# Patient Record
Sex: FEMALE | Race: WHITE | Employment: UNEMPLOYED | URBAN - METROPOLITAN AREA
[De-identification: names, ages, dates, MRNs, and addresses within clinical notes are randomized per-mention and may not be internally consistent; named-entity substitution may affect disease eponyms.]

---

## 2017-08-12 ENCOUNTER — HOSPITAL ENCOUNTER (EMERGENCY)
Age: 37
Discharge: HOME | End: 2017-08-12
Payer: COMMERCIAL

## 2017-08-12 ENCOUNTER — APPOINTMENT (OUTPATIENT)
Dept: GENERAL RADIOLOGY | Age: 37
End: 2017-08-12
Payer: COMMERCIAL

## 2017-08-12 VITALS
OXYGEN SATURATION: 100 % | HEIGHT: 68 IN | TEMPERATURE: 97.9 F | SYSTOLIC BLOOD PRESSURE: 113 MMHG | HEART RATE: 84 BPM | BODY MASS INDEX: 27.28 KG/M2 | WEIGHT: 180 LBS | RESPIRATION RATE: 21 BRPM | DIASTOLIC BLOOD PRESSURE: 64 MMHG

## 2017-08-12 DIAGNOSIS — J20.9 BRONCHITIS, ACUTE, WITH BRONCHOSPASM: Primary | ICD-10-CM

## 2017-08-12 LAB
ABSOLUTE EOS #: 0.08 K/UL (ref 0–0.4)
ABSOLUTE LYMPH #: 2.63 K/UL (ref 1–4.8)
ABSOLUTE MONO #: 0.45 K/UL (ref 0.2–0.8)
ANION GAP SERPL CALCULATED.3IONS-SCNC: 19 MMOL/L (ref 9–17)
BASOPHILS # BLD: 0 %
BASOPHILS ABSOLUTE: 0 K/UL (ref 0–0.2)
BUN BLDV-MCNC: 10 MG/DL (ref 6–20)
BUN/CREAT BLD: 11 (ref 9–20)
CALCIUM SERPL-MCNC: 9.6 MG/DL (ref 8.6–10.4)
CHLORIDE BLD-SCNC: 98 MMOL/L (ref 98–107)
CO2: 21 MMOL/L (ref 20–31)
CREAT SERPL-MCNC: 0.88 MG/DL (ref 0.5–0.9)
D-DIMER QUANTITATIVE: 0.51 MG/L FEU
DIFFERENTIAL TYPE: ABNORMAL
EOSINOPHILS RELATIVE PERCENT: 1 %
GFR AFRICAN AMERICAN: >60 ML/MIN
GFR NON-AFRICAN AMERICAN: >60 ML/MIN
GFR SERPL CREATININE-BSD FRML MDRD: ABNORMAL ML/MIN/{1.73_M2}
GFR SERPL CREATININE-BSD FRML MDRD: ABNORMAL ML/MIN/{1.73_M2}
GLUCOSE BLD-MCNC: 107 MG/DL (ref 70–99)
HCT VFR BLD CALC: 36.7 % (ref 36–46)
HEMOGLOBIN: 11.5 G/DL (ref 12–16)
INR BLD: 1
LYMPHOCYTES # BLD: 35 %
MCH RBC QN AUTO: 19.9 PG (ref 26–34)
MCHC RBC AUTO-ENTMCNC: 31.2 G/DL (ref 31–37)
MCV RBC AUTO: 63.8 FL (ref 80–100)
MONOCYTES # BLD: 6 %
MORPHOLOGY: ABNORMAL
PDW BLD-RTO: 18.7 % (ref 11.5–14.5)
PLATELET # BLD: 776 K/UL (ref 130–400)
PLATELET ESTIMATE: ABNORMAL
PMV BLD AUTO: ABNORMAL FL (ref 6–12)
POTASSIUM SERPL-SCNC: 4.1 MMOL/L (ref 3.7–5.3)
PROTHROMBIN TIME: 10.8 SEC (ref 9.7–11.6)
RBC # BLD: 5.75 M/UL (ref 4–5.2)
RBC # BLD: ABNORMAL 10*6/UL
SEG NEUTROPHILS: 58 %
SEGMENTED NEUTROPHILS ABSOLUTE COUNT: 4.34 K/UL (ref 1.8–7.7)
SODIUM BLD-SCNC: 138 MMOL/L (ref 135–144)
WBC # BLD: 7.5 K/UL (ref 3.5–11)
WBC # BLD: ABNORMAL 10*3/UL

## 2017-08-12 PROCEDURE — 87040 BLOOD CULTURE FOR BACTERIA: CPT

## 2017-08-12 PROCEDURE — 85025 COMPLETE CBC W/AUTO DIFF WBC: CPT

## 2017-08-12 PROCEDURE — 2580000003 HC RX 258

## 2017-08-12 PROCEDURE — 85379 FIBRIN DEGRADATION QUANT: CPT

## 2017-08-12 PROCEDURE — 80048 BASIC METABOLIC PNL TOTAL CA: CPT

## 2017-08-12 PROCEDURE — 85610 PROTHROMBIN TIME: CPT

## 2017-08-12 PROCEDURE — 96374 THER/PROPH/DIAG INJ IV PUSH: CPT

## 2017-08-12 PROCEDURE — 6360000002 HC RX W HCPCS

## 2017-08-12 PROCEDURE — 71010 XR CHEST PORTABLE: CPT

## 2017-08-12 PROCEDURE — 99285 EMERGENCY DEPT VISIT HI MDM: CPT

## 2017-08-12 PROCEDURE — 94640 AIRWAY INHALATION TREATMENT: CPT

## 2017-08-12 PROCEDURE — 93005 ELECTROCARDIOGRAM TRACING: CPT

## 2017-08-12 RX ORDER — AZITHROMYCIN 250 MG/1
TABLET, FILM COATED ORAL
Qty: 1 PACKET | Refills: 0 | Status: SHIPPED | OUTPATIENT
Start: 2017-08-12 | End: 2017-08-22

## 2017-08-12 RX ORDER — ALBUTEROL SULFATE 2.5 MG/3ML
5 SOLUTION RESPIRATORY (INHALATION)
Status: DISCONTINUED | OUTPATIENT
Start: 2017-08-12 | End: 2017-08-13 | Stop reason: HOSPADM

## 2017-08-12 RX ORDER — FLUCONAZOLE 150 MG/1
150 TABLET ORAL ONCE
Qty: 1 TABLET | Refills: 0 | Status: SHIPPED | OUTPATIENT
Start: 2017-08-12 | End: 2017-08-12

## 2017-08-12 RX ORDER — ALBUTEROL SULFATE 90 UG/1
2 AEROSOL, METERED RESPIRATORY (INHALATION)
Status: DISCONTINUED | OUTPATIENT
Start: 2017-08-12 | End: 2017-08-13 | Stop reason: HOSPADM

## 2017-08-12 RX ORDER — IPRATROPIUM BROMIDE AND ALBUTEROL SULFATE 2.5; .5 MG/3ML; MG/3ML
1 SOLUTION RESPIRATORY (INHALATION)
Status: DISCONTINUED | OUTPATIENT
Start: 2017-08-12 | End: 2017-08-13 | Stop reason: HOSPADM

## 2017-08-12 RX ORDER — SODIUM CHLORIDE 9 MG/ML
INJECTION, SOLUTION INTRAVENOUS CONTINUOUS
Status: DISCONTINUED | OUTPATIENT
Start: 2017-08-12 | End: 2017-08-13 | Stop reason: HOSPADM

## 2017-08-12 RX ORDER — PREDNISONE 20 MG/1
20 TABLET ORAL 2 TIMES DAILY
Qty: 10 TABLET | Refills: 0 | Status: SHIPPED | OUTPATIENT
Start: 2017-08-12 | End: 2017-08-22

## 2017-08-12 RX ORDER — METHYLPREDNISOLONE SODIUM SUCCINATE 125 MG/2ML
125 INJECTION, POWDER, LYOPHILIZED, FOR SOLUTION INTRAMUSCULAR; INTRAVENOUS ONCE
Status: COMPLETED | OUTPATIENT
Start: 2017-08-12 | End: 2017-08-12

## 2017-08-12 RX ADMIN — METHYLPREDNISOLONE SODIUM SUCCINATE 125 MG: 125 INJECTION, POWDER, FOR SOLUTION INTRAMUSCULAR; INTRAVENOUS at 21:03

## 2017-08-12 RX ADMIN — SODIUM CHLORIDE: 9 INJECTION, SOLUTION INTRAVENOUS at 21:03

## 2017-08-12 RX ADMIN — ALBUTEROL SULFATE 5 MG: 5 SOLUTION RESPIRATORY (INHALATION) at 21:20

## 2017-08-12 ASSESSMENT — ENCOUNTER SYMPTOMS
SINUS PRESSURE: 1
COUGH: 1
NAUSEA: 1
CHEST TIGHTNESS: 1
VOMITING: 0
ABDOMINAL DISTENTION: 0
SORE THROAT: 1
ABDOMINAL PAIN: 0
PHOTOPHOBIA: 0

## 2017-08-12 ASSESSMENT — PAIN DESCRIPTION - PAIN TYPE: TYPE: ACUTE PAIN

## 2017-08-12 ASSESSMENT — PAIN SCALES - GENERAL: PAINLEVEL_OUTOF10: 5

## 2017-08-12 ASSESSMENT — PAIN DESCRIPTION - LOCATION: LOCATION: RIB CAGE

## 2017-08-15 LAB
EKG ATRIAL RATE: 105 BPM
EKG P AXIS: 33 DEGREES
EKG P-R INTERVAL: 140 MS
EKG Q-T INTERVAL: 350 MS
EKG QRS DURATION: 84 MS
EKG QTC CALCULATION (BAZETT): 462 MS
EKG R AXIS: 31 DEGREES
EKG T AXIS: 6 DEGREES
EKG VENTRICULAR RATE: 105 BPM

## 2017-08-18 LAB
CULTURE: NORMAL
CULTURE: NORMAL
Lab: NORMAL
SPECIMEN DESCRIPTION: NORMAL
SPECIMEN DESCRIPTION: NORMAL
STATUS: NORMAL

## 2020-10-14 ENCOUNTER — HOSPITAL ENCOUNTER (OUTPATIENT)
Age: 40
Discharge: HOME | End: 2020-10-14
Payer: COMMERCIAL

## 2020-10-14 LAB
ABSOLUTE EOS #: 0.38 K/UL (ref 0–0.4)
ABSOLUTE IMMATURE GRANULOCYTE: 0 K/UL (ref 0–0.3)
ABSOLUTE LYMPH #: 2.24 K/UL (ref 1–4.8)
ABSOLUTE MONO #: 0.38 K/UL (ref 0.1–0.8)
ALBUMIN SERPL-MCNC: 3.4 G/DL (ref 3.5–5.2)
ALBUMIN/GLOBULIN RATIO: 1.5 (ref 1–2.5)
ALP BLD-CCNC: 80 U/L (ref 35–104)
ALT SERPL-CCNC: 11 U/L (ref 5–33)
ANION GAP SERPL CALCULATED.3IONS-SCNC: 10 MMOL/L (ref 9–17)
AST SERPL-CCNC: 19 U/L
BASOPHILS # BLD: 1 % (ref 0–2)
BASOPHILS ABSOLUTE: 0.05 K/UL (ref 0–0.2)
BILIRUB SERPL-MCNC: 0.37 MG/DL (ref 0.3–1.2)
BUN BLDV-MCNC: 11 MG/DL (ref 6–20)
BUN/CREAT BLD: ABNORMAL (ref 9–20)
CALCIUM SERPL-MCNC: 8.5 MG/DL (ref 8.6–10.4)
CHLORIDE BLD-SCNC: 109 MMOL/L (ref 98–107)
CHOLESTEROL, FASTING: 106 MG/DL
CHOLESTEROL/HDL RATIO: 2.3
CO2: 22 MMOL/L (ref 20–31)
CREAT SERPL-MCNC: 0.45 MG/DL (ref 0.5–0.9)
DIFFERENTIAL TYPE: ABNORMAL
EOSINOPHILS RELATIVE PERCENT: 8 % (ref 1–4)
ESTIMATED AVERAGE GLUCOSE: 105 MG/DL
FERRITIN: 4 UG/L (ref 13–150)
FOLATE: 13.3 NG/ML
GFR AFRICAN AMERICAN: >60 ML/MIN
GFR NON-AFRICAN AMERICAN: >60 ML/MIN
GFR SERPL CREATININE-BSD FRML MDRD: ABNORMAL ML/MIN/{1.73_M2}
GFR SERPL CREATININE-BSD FRML MDRD: ABNORMAL ML/MIN/{1.73_M2}
GLUCOSE BLD-MCNC: 88 MG/DL (ref 70–99)
HBA1C MFR BLD: 5.3 % (ref 4–6)
HCT VFR BLD CALC: 29.9 % (ref 36.3–47.1)
HDLC SERPL-MCNC: 46 MG/DL
HEMOGLOBIN: 7.5 G/DL (ref 11.9–15.1)
IMMATURE GRANULOCYTES: 0 %
IRON SATURATION: 4 % (ref 20–55)
IRON: 17 UG/DL (ref 37–145)
LDL CHOLESTEROL: 47 MG/DL (ref 0–130)
LYMPHOCYTES # BLD: 48 % (ref 24–44)
MCH RBC QN AUTO: 17.5 PG (ref 25.2–33.5)
MCHC RBC AUTO-ENTMCNC: 25.1 G/DL (ref 28.4–34.8)
MCV RBC AUTO: 69.7 FL (ref 82.6–102.9)
MONOCYTES # BLD: 8 % (ref 1–7)
MORPHOLOGY: ABNORMAL
NRBC AUTOMATED: 0 PER 100 WBC
PDW BLD-RTO: 18.6 % (ref 11.8–14.4)
PLATELET # BLD: 217 K/UL (ref 138–453)
PLATELET ESTIMATE: ABNORMAL
PMV BLD AUTO: 10.7 FL (ref 8.1–13.5)
POTASSIUM SERPL-SCNC: 4.3 MMOL/L (ref 3.7–5.3)
RBC # BLD: 4.29 M/UL (ref 3.95–5.11)
RBC # BLD: ABNORMAL 10*6/UL
SEG NEUTROPHILS: 35 % (ref 36–66)
SEGMENTED NEUTROPHILS ABSOLUTE COUNT: 1.65 K/UL (ref 1.8–7.7)
SODIUM BLD-SCNC: 141 MMOL/L (ref 135–144)
T3 FREE: 2.63 PG/ML (ref 2.02–4.43)
THYROXINE, FREE: 1.03 NG/DL (ref 0.93–1.7)
TOTAL IRON BINDING CAPACITY: 409 UG/DL (ref 250–450)
TOTAL PROTEIN: 5.7 G/DL (ref 6.4–8.3)
TRIGLYCERIDE, FASTING: 67 MG/DL
TSH SERPL DL<=0.05 MIU/L-ACNC: 3.14 MIU/L (ref 0.3–5)
UNSATURATED IRON BINDING CAPACITY: 392 UG/DL (ref 112–347)
VITAMIN B-12: 234 PG/ML (ref 232–1245)
VITAMIN D 25-HYDROXY: 6.2 NG/ML (ref 30–100)
VLDLC SERPL CALC-MCNC: NORMAL MG/DL (ref 1–30)
WBC # BLD: 4.7 K/UL (ref 3.5–11.3)
WBC # BLD: ABNORMAL 10*3/UL

## 2020-10-14 PROCEDURE — 82306 VITAMIN D 25 HYDROXY: CPT

## 2020-10-14 PROCEDURE — 80053 COMPREHEN METABOLIC PANEL: CPT

## 2020-10-14 PROCEDURE — 84481 FREE ASSAY (FT-3): CPT

## 2020-10-14 PROCEDURE — 82607 VITAMIN B-12: CPT

## 2020-10-14 PROCEDURE — 83540 ASSAY OF IRON: CPT

## 2020-10-14 PROCEDURE — 83550 IRON BINDING TEST: CPT

## 2020-10-14 PROCEDURE — 84439 ASSAY OF FREE THYROXINE: CPT

## 2020-10-14 PROCEDURE — 85025 COMPLETE CBC W/AUTO DIFF WBC: CPT

## 2020-10-14 PROCEDURE — 82728 ASSAY OF FERRITIN: CPT

## 2020-10-14 PROCEDURE — 80061 LIPID PANEL: CPT

## 2020-10-14 PROCEDURE — 83036 HEMOGLOBIN GLYCOSYLATED A1C: CPT

## 2020-10-14 PROCEDURE — 84443 ASSAY THYROID STIM HORMONE: CPT

## 2020-10-14 PROCEDURE — 82746 ASSAY OF FOLIC ACID SERUM: CPT

## 2020-10-14 PROCEDURE — 36415 COLL VENOUS BLD VENIPUNCTURE: CPT

## 2021-02-02 ENCOUNTER — TELEPHONE (OUTPATIENT)
Dept: ONCOLOGY | Age: 41
End: 2021-02-02

## 2021-02-02 NOTE — TELEPHONE ENCOUNTER
RECEIVED A FAXED REFERRAL FROM  [facility] OFF[facility] @[facility]. PT HAS PARAMOUNT INSURANCE, WRITER CALLED DR ELLISON [facility] OFFICE AND LEFT VOICEMAIL FOR MD'S CLINICAL STAFF STATING WE DO NOT TAKE PATIENT'S INSURANCE AND PT NEEDS TO BE REFERRED TO A [facility]. WRITER LEFT DIRECT PHONE NUMBER INCASE THERE ARE QUESTIONS.

## 2021-07-30 ENCOUNTER — HOSPITAL ENCOUNTER (EMERGENCY)
Age: 41
Discharge: HOME | End: 2021-07-30
Attending: EMERGENCY MEDICINE
Payer: COMMERCIAL

## 2021-07-30 VITALS
TEMPERATURE: 98.2 F | HEIGHT: 68 IN | OXYGEN SATURATION: 100 % | BODY MASS INDEX: 28.04 KG/M2 | RESPIRATION RATE: 20 BRPM | DIASTOLIC BLOOD PRESSURE: 97 MMHG | WEIGHT: 185 LBS | SYSTOLIC BLOOD PRESSURE: 122 MMHG | HEART RATE: 103 BPM

## 2021-07-30 NOTE — ED TRIAGE NOTES
Patient appears very anxious in triage stating that she cannot breath. SOB started about 15 minutes prior to arrival. Patient SPO2 100% during triage and patient reassured. Patient taking picture of her BP reading, stating that this is really high for her. Patient initially refusing to go to room stating that she \"needs to go outside because she cant catch her breath. \" Patient wheeled to room and requesting that doctor comes in \"right away! \"

## 2022-06-10 ENCOUNTER — HOSPITAL ENCOUNTER (EMERGENCY)
Age: 42
Discharge: HOME | End: 2022-06-10
Attending: EMERGENCY MEDICINE
Payer: MEDICARE

## 2022-06-10 ENCOUNTER — APPOINTMENT (OUTPATIENT)
Dept: CT IMAGING | Age: 42
End: 2022-06-10
Payer: MEDICARE

## 2022-06-10 ENCOUNTER — APPOINTMENT (OUTPATIENT)
Dept: GENERAL RADIOLOGY | Age: 42
End: 2022-06-10
Payer: MEDICARE

## 2022-06-10 VITALS
HEIGHT: 68 IN | OXYGEN SATURATION: 100 % | SYSTOLIC BLOOD PRESSURE: 119 MMHG | BODY MASS INDEX: 28.04 KG/M2 | TEMPERATURE: 98.1 F | DIASTOLIC BLOOD PRESSURE: 73 MMHG | HEART RATE: 92 BPM | WEIGHT: 185 LBS | RESPIRATION RATE: 24 BRPM

## 2022-06-10 DIAGNOSIS — R06.09 DYSPNEA ON EXERTION: Primary | ICD-10-CM

## 2022-06-10 DIAGNOSIS — D50.9 IRON DEFICIENCY ANEMIA, UNSPECIFIED IRON DEFICIENCY ANEMIA TYPE: ICD-10-CM

## 2022-06-10 DIAGNOSIS — K56.7 ILEUS (HCC): ICD-10-CM

## 2022-06-10 DIAGNOSIS — K59.00 CONSTIPATION, UNSPECIFIED CONSTIPATION TYPE: ICD-10-CM

## 2022-06-10 LAB
ABO/RH: NORMAL
ABSOLUTE EOS #: 0.11 K/UL (ref 0–0.44)
ABSOLUTE IMMATURE GRANULOCYTE: 0 K/UL (ref 0–0.3)
ABSOLUTE LYMPH #: 1.33 K/UL (ref 1.1–3.7)
ABSOLUTE MONO #: 0.41 K/UL (ref 0.1–1.2)
ALBUMIN SERPL-MCNC: 3.8 G/DL (ref 3.5–5.2)
ALP BLD-CCNC: 85 U/L (ref 35–104)
ALT SERPL-CCNC: 16 U/L (ref 5–33)
ANION GAP SERPL CALCULATED.3IONS-SCNC: 12 MMOL/L (ref 9–17)
ANTIBODY SCREEN: NEGATIVE
ARM BAND NUMBER: NORMAL
AST SERPL-CCNC: 28 U/L
BASOPHILS # BLD: 2 % (ref 0–2)
BASOPHILS ABSOLUTE: 0.07 K/UL (ref 0–0.2)
BILIRUB SERPL-MCNC: 0.38 MG/DL (ref 0.3–1.2)
BUN BLDV-MCNC: 20 MG/DL (ref 6–20)
BUN/CREAT BLD: 29 (ref 9–20)
CALCIUM SERPL-MCNC: 9.4 MG/DL (ref 8.6–10.4)
CHLORIDE BLD-SCNC: 100 MMOL/L (ref 98–107)
CO2: 22 MMOL/L (ref 20–31)
CREAT SERPL-MCNC: 0.7 MG/DL (ref 0.5–0.9)
D-DIMER QUANTITATIVE: <0.27 MG/L FEU (ref 0–0.59)
EKG ATRIAL RATE: 70 BPM
EKG P AXIS: -6 DEGREES
EKG P-R INTERVAL: 164 MS
EKG Q-T INTERVAL: 454 MS
EKG QRS DURATION: 90 MS
EKG QTC CALCULATION (BAZETT): 490 MS
EKG R AXIS: -14 DEGREES
EKG T AXIS: -3 DEGREES
EKG VENTRICULAR RATE: 70 BPM
EOSINOPHILS RELATIVE PERCENT: 3 % (ref 1–4)
EXPIRATION DATE: NORMAL
FERRITIN: 5 NG/ML (ref 13–150)
FOLATE: >20 NG/ML
GFR AFRICAN AMERICAN: >60 ML/MIN
GFR NON-AFRICAN AMERICAN: >60 ML/MIN
GFR SERPL CREATININE-BSD FRML MDRD: ABNORMAL ML/MIN/{1.73_M2}
GLUCOSE BLD-MCNC: 109 MG/DL (ref 70–99)
HCT VFR BLD CALC: 31.2 % (ref 36.3–47.1)
HEMOGLOBIN: 8.9 G/DL (ref 11.9–15.1)
IMMATURE GRANULOCYTES: 0 %
IRON SATURATION: 6 % (ref 20–55)
IRON: 24 UG/DL (ref 37–145)
LYMPHOCYTES # BLD: 36 % (ref 24–43)
MCH RBC QN AUTO: 19.8 PG (ref 25.2–33.5)
MCHC RBC AUTO-ENTMCNC: 28.5 G/DL (ref 28.4–34.8)
MCV RBC AUTO: 69.5 FL (ref 82.6–102.9)
MONOCYTES # BLD: 11 % (ref 3–12)
MORPHOLOGY: ABNORMAL
PDW BLD-RTO: 19.4 % (ref 11.8–14.4)
PLATELET # BLD: 318 K/UL (ref 138–453)
PMV BLD AUTO: 10 FL (ref 8.1–13.5)
POTASSIUM SERPL-SCNC: 4.1 MMOL/L (ref 3.7–5.3)
PRO-BNP: 189 PG/ML
RBC # BLD: 4.49 M/UL (ref 3.95–5.11)
SEG NEUTROPHILS: 48 % (ref 36–65)
SEGMENTED NEUTROPHILS ABSOLUTE COUNT: 1.78 K/UL (ref 1.5–8.1)
SODIUM BLD-SCNC: 134 MMOL/L (ref 135–144)
TOTAL IRON BINDING CAPACITY: 420 UG/DL (ref 250–450)
TOTAL PROTEIN: 6.2 G/DL (ref 6.4–8.3)
TROPONIN, HIGH SENSITIVITY: 16 NG/L (ref 0–14)
TROPONIN, HIGH SENSITIVITY: 18 NG/L (ref 0–14)
UNSATURATED IRON BINDING CAPACITY: 396 UG/DL (ref 112–347)
VITAMIN B-12: 477 PG/ML (ref 232–1245)
WBC # BLD: 3.7 K/UL (ref 3.5–11.3)

## 2022-06-10 PROCEDURE — 6360000002 HC RX W HCPCS: Performed by: EMERGENCY MEDICINE

## 2022-06-10 PROCEDURE — 2580000003 HC RX 258: Performed by: EMERGENCY MEDICINE

## 2022-06-10 PROCEDURE — 82746 ASSAY OF FOLIC ACID SERUM: CPT

## 2022-06-10 PROCEDURE — 82607 VITAMIN B-12: CPT

## 2022-06-10 PROCEDURE — 82728 ASSAY OF FERRITIN: CPT

## 2022-06-10 PROCEDURE — 86850 RBC ANTIBODY SCREEN: CPT

## 2022-06-10 PROCEDURE — 71260 CT THORAX DX C+: CPT

## 2022-06-10 PROCEDURE — 86901 BLOOD TYPING SEROLOGIC RH(D): CPT

## 2022-06-10 PROCEDURE — 83550 IRON BINDING TEST: CPT

## 2022-06-10 PROCEDURE — 71045 X-RAY EXAM CHEST 1 VIEW: CPT

## 2022-06-10 PROCEDURE — 85025 COMPLETE CBC W/AUTO DIFF WBC: CPT

## 2022-06-10 PROCEDURE — 83540 ASSAY OF IRON: CPT

## 2022-06-10 PROCEDURE — 85379 FIBRIN DEGRADATION QUANT: CPT

## 2022-06-10 PROCEDURE — 6360000004 HC RX CONTRAST MEDICATION: Performed by: EMERGENCY MEDICINE

## 2022-06-10 PROCEDURE — 96365 THER/PROPH/DIAG IV INF INIT: CPT

## 2022-06-10 PROCEDURE — 80053 COMPREHEN METABOLIC PANEL: CPT

## 2022-06-10 PROCEDURE — 83880 ASSAY OF NATRIURETIC PEPTIDE: CPT

## 2022-06-10 PROCEDURE — 74177 CT ABD & PELVIS W/CONTRAST: CPT

## 2022-06-10 PROCEDURE — 99285 EMERGENCY DEPT VISIT HI MDM: CPT

## 2022-06-10 PROCEDURE — 93010 ELECTROCARDIOGRAM REPORT: CPT | Performed by: INTERNAL MEDICINE

## 2022-06-10 PROCEDURE — 84484 ASSAY OF TROPONIN QUANT: CPT

## 2022-06-10 PROCEDURE — 93005 ELECTROCARDIOGRAM TRACING: CPT | Performed by: EMERGENCY MEDICINE

## 2022-06-10 PROCEDURE — 96366 THER/PROPH/DIAG IV INF ADDON: CPT

## 2022-06-10 PROCEDURE — 86900 BLOOD TYPING SEROLOGIC ABO: CPT

## 2022-06-10 RX ORDER — PNV NO.95/FERROUS FUM/FOLIC AC 28MG-0.8MG
1 TABLET ORAL DAILY
Qty: 30 TABLET | Refills: 1 | Status: SHIPPED | OUTPATIENT
Start: 2022-06-10

## 2022-06-10 RX ORDER — DOCUSATE SODIUM 100 MG/1
100 CAPSULE, LIQUID FILLED ORAL 2 TIMES DAILY PRN
Qty: 30 CAPSULE | Refills: 1 | Status: SHIPPED | OUTPATIENT
Start: 2022-06-10

## 2022-06-10 RX ORDER — 0.9 % SODIUM CHLORIDE 0.9 %
80 INTRAVENOUS SOLUTION INTRAVENOUS ONCE
Status: DISCONTINUED | OUTPATIENT
Start: 2022-06-10 | End: 2022-06-10 | Stop reason: HOSPADM

## 2022-06-10 RX ORDER — SODIUM CHLORIDE 0.9 % (FLUSH) 0.9 %
10 SYRINGE (ML) INJECTION PRN
Status: DISCONTINUED | OUTPATIENT
Start: 2022-06-10 | End: 2022-06-10 | Stop reason: HOSPADM

## 2022-06-10 RX ORDER — SENNA PLUS 8.6 MG/1
2 TABLET ORAL DAILY PRN
Qty: 60 TABLET | Refills: 0 | Status: SHIPPED | OUTPATIENT
Start: 2022-06-10 | End: 2022-07-10

## 2022-06-10 RX ORDER — MAGNESIUM CARB/ALUMINUM HYDROX 105-160MG
296 TABLET,CHEWABLE ORAL
Qty: 296 ML | Refills: 0 | Status: SHIPPED | OUTPATIENT
Start: 2022-06-10 | End: 2022-06-10

## 2022-06-10 RX ORDER — POLYETHYLENE GLYCOL 3350 17 G/17G
17 POWDER, FOR SOLUTION ORAL DAILY
Qty: 238 G | Refills: 0 | Status: SHIPPED | OUTPATIENT
Start: 2022-06-10 | End: 2022-06-17

## 2022-06-10 RX ADMIN — IRON SUCROSE 300 MG: 20 INJECTION, SOLUTION INTRAVENOUS at 16:25

## 2022-06-10 RX ADMIN — SODIUM CHLORIDE, PRESERVATIVE FREE 10 ML: 5 INJECTION INTRAVENOUS at 16:11

## 2022-06-10 RX ADMIN — IOPAMIDOL 75 ML: 755 INJECTION, SOLUTION INTRAVENOUS at 16:10

## 2022-06-10 RX ADMIN — Medication 80 ML: at 16:10

## 2022-06-10 ASSESSMENT — ENCOUNTER SYMPTOMS
CHEST TIGHTNESS: 1
CONSTIPATION: 1
VOMITING: 0
NAUSEA: 0
SHORTNESS OF BREATH: 1
ABDOMINAL PAIN: 0
COLOR CHANGE: 1
BACK PAIN: 0
BLOOD IN STOOL: 0

## 2022-06-10 ASSESSMENT — PAIN - FUNCTIONAL ASSESSMENT: PAIN_FUNCTIONAL_ASSESSMENT: NONE - DENIES PAIN

## 2022-06-10 NOTE — ED PROVIDER NOTES
[facility]  Emergency Department Encounter     Pt Name: Adis Birmingham  MRN: [id number]  [place] 1980  Date of evaluation: 6/10/22  PCP:  KETTY Cummings NP    CHIEF COMPLAINT       Chief Complaint   Patient presents with    Shortness of Breath     states dyspnea with any movement, denies Hx of Asthma, COPD, or CHF       HISTORY OF PRESENT ILLNESS  (Location/Symptom, Timing/Onset, Context/Setting, Quality, Duration, Modifying Factors, Severity.)    Adis Birmingham is a 39 y.o. female who presents with weeks of shortness of breath, however this has been rapidly progressing over the past couple of days and is also included with exertional dyspnea, fatigue, generalized weakness. Patient states that she does have a history of iron deficiency anemia and has needed iron infusions and blood transfusions in the past.  She has not any blood thinners. Has not noticed any nosebleeds, abnormal vaginal bleeding, blood in her urine, blood in her stool. She has a history of gastric bypass back in , however recently has also noted some unintentional weight loss. She has not had any chest pain or palpitations. Has chronic right lower leg swelling secondary to prior injury and surgery with hardware in place. She does currently smoke cigarettes, however states that that she has been feeling unwell she has been unable to smoke as much as she normally does. Does have a history of prior IV drug abuse. PAST MEDICAL / SURGICAL / SOCIAL / FAMILY HISTORY    has a past medical history of Anemia requiring transfusions, Anxiety, Cervical neuritis, Depression, Frequent UTI, Susan filter in place, Meningioma ([facility] [address] Migraines, Scoliosis, Weakness of left arm, and Wears dentures. has a past surgical history that includes Adenoidectomy; Tonsillectomy; Ankle surgery; Gastric bypass surgery; Dilation and curettage of uterus;   section; Tubal ligation; Dental surgery; and Cervical spine surgery (06/21/2016). Social History     Socioeconomic History    Marital status:      Spouse name: Not on file    Number of children: 2    Years of education: Not on file    Highest education level: Not on file   Occupational History    Occupation: unemployed   Tobacco Use    Smoking status: Current Every Day Smoker     Packs/day: 1.00     Years: 8.00     Pack years: 8.00     Types: Cigarettes    Smokeless tobacco: Never Used   Vaping Use    Vaping Use: Never used   Substance and Sexual Activity    Alcohol use: No    Drug use: Yes     Types: Opiates     Sexual activity: Not on file   Other Topics Concern    Not on file   Social History Narrative    Not on file     Social Determinants of Health     Financial Resource Strain:     Difficulty of Paying Living Expenses: Not on file   Food Insecurity:     Worried About Running Out of Food in the Last Year: Not on file    Yazmin of Food in the Last Year: Not on file   Transportation Needs:     Lack of Transportation (Medical): Not on file    Lack of Transportation (Non-Medical):  Not on file   Physical Activity:     Days of Exercise per Week: Not on file    Minutes of Exercise per Session: Not on file   Stress:     Feeling of Stress : Not on file   Social Connections:     Frequency of Communication with Friends and Family: Not on file    Frequency of Social Gatherings with Friends and Family: Not on file    Attends Zoroastrian Services: Not on file    Active Member of Clubs or Organizations: Not on file    Attends Club or Organization Meetings: Not on file    Marital Status: Not on file   Intimate Partner Violence:     Fear of Current or Ex-Partner: Not on file    Emotionally Abused: Not on file    Physically Abused: Not on file    Sexually Abused: Not on file   Housing Stability:     Unable to Pay for Housing in the Last Year: Not on file    Number of [place] in the Last Year: Not on file    Unstable Housing in the Last Year: Not on file       History reviewed. No pertinent family history. Allergies:    Ciprofloxacin, Codeine, Compazine [prochlorperazine maleate], Indocin [indomethacin], and Zofran [ondansetron hcl]    Home Medications:  Prior to Admission medications    Medication Sig Start Date End Date Taking? Authorizing Provider   Ferrous Sulfate (IRON) 325 (65 Fe) MG TABS Take 1 tablet by mouth daily 6/10/22  Yes Rocio Mosley DO   docusate sodium (COLACE) 100 mg capsule Take 1 capsule by mouth 2 times daily as needed for Constipation 6/10/22  Yes Maura Haro DO   polyethylene glycol (MIRALAX) 17 GM/SCOOP powder Take 17 g by mouth daily for 7 days PRN constipation 6/10/22 6/17/22 Yes Rocio Mosley DO   senna (SENOKOT) 8.6 MG tablet Take 2 tablets by mouth daily as needed for Constipation 6/10/22 7/10/22 Yes Maura Hrao DO   Magnesium Citrate 1.745 GM/30ML solution Take 296 mLs by mouth once as needed for Constipation 6/10/22 6/10/22 Yes Rocio Mosley DO   Buprenorphine HCl-Naloxone HCl (SUBOXONE SL) Place under the tongue    Historical Provider, MD   buPROPion (WELLBUTRIN) 100 MG tablet Take 100 mg by mouth 2 times daily     Historical Provider, MD   amphetamine-dextroamphetamine (ADDERALL, 15MG,) 15 MG tablet Take 15 mg by mouth 2 times daily    Historical Provider, MD       REVIEW OF SYSTEMS    (2-9 systems for level 4, 10 or more for level 5)    Review of Systems   Constitutional: Positive for fatigue and unexpected weight change. HENT: Negative for nosebleeds. Eyes: Negative for visual disturbance. Respiratory: Positive for chest tightness and shortness of breath. Cardiovascular: Positive for palpitations. Negative for chest pain. Gastrointestinal: Positive for constipation (chronic). Negative for abdominal pain, blood in stool, nausea and vomiting. Genitourinary: Negative for flank pain and hematuria. Musculoskeletal: Negative for back pain.    Skin: Positive for color change and pallor. Neurological: Positive for dizziness, weakness and light-headedness. Negative for syncope. Hematological: Does not bruise/bleed easily. PHYSICAL EXAM   (up to 7 for level 4, 8 or more for level 5)    VITALS:   Vitals:    06/10/22 1449   BP: 119/73   Pulse: 92   Resp: 24   Temp: 98.1 °F (36.7 °C)   TempSrc: Oral   SpO2: 100%   Weight: 185 lb (83.9 kg)   Height: 5' 8\" (1.727 m)       Physical Exam  Vitals and nursing note reviewed. Constitutional:       General: She is not in acute distress. Appearance: She is well-developed. She is cachectic. She is ill-appearing. She is not diaphoretic. HENT:      Head: Normocephalic and atraumatic. Eyes:      Conjunctiva/sclera: Conjunctivae normal.      Comments: Eye sunken   Cardiovascular:      Rate and Rhythm: Normal rate and regular rhythm. Heart sounds: Normal heart sounds. Pulmonary:      Effort: Pulmonary effort is normal. No respiratory distress. Breath sounds: Normal breath sounds. No wheezing, rhonchi or rales. Abdominal:      General: Abdomen is flat. A surgical scar is present. There is no distension. Palpations: Abdomen is soft. Tenderness: There is no abdominal tenderness. There is no guarding or rebound. Musculoskeletal:         General: Normal range of motion. Cervical back: Normal range of motion. Right lower leg: Edema present. Comments: Lymphedema RLE, chronic from prior injury and surgery per patient    Skin:     General: Skin is warm and dry. Coloration: Skin is pale. Neurological:      General: No focal deficit present. Mental Status: She is alert and oriented to person, place, and time.    Psychiatric:         Behavior: Behavior normal.         DIFFERENTIAL  DIAGNOSIS   PLAN (LABS / IMAGING / EKG):  Orders Placed This Encounter   Procedures    XR CHEST 1 VIEW    CT CHEST PULMONARY EMBOLISM W CONTRAST    CT ABDOMEN PELVIS W IV CONTRAST Additional Contrast? None    CBC with Auto Differential    Comprehensive Metabolic Panel w/ Reflex to MG    Vitamin B12 & Folate    Ferritin    Iron and TIBC    Troponin    Brain Natriuretic Peptide    Urinalysis with Microscopic    D-Dimer, Quantitative    Troponin    Inpatient consult to General Surgery    EKG 12 Lead    TYPE AND SCREEN    Insert peripheral IV       MEDICATIONS ORDERED:  Orders Placed This Encounter   Medications    iron sucrose (VENOFER) 300 mg in sodium chloride 0.9 % 250 mL IVPB    0.9 % sodium chloride bolus    sodium chloride flush 0.9 % injection 10 mL    iopamidol (ISOVUE-370) 76 % injection 75 mL    Ferrous Sulfate (IRON) 325 (65 Fe) MG TABS     Sig: Take 1 tablet by mouth daily     Dispense:  30 tablet     Refill:  1    docusate sodium (COLACE) 100 mg capsule     Sig: Take 1 capsule by mouth 2 times daily as needed for Constipation     Dispense:  30 capsule     Refill:  1    polyethylene glycol (MIRALAX) 17 GM/SCOOP powder     Sig: Take 17 g by mouth daily for 7 days PRN constipation     Dispense:  238 g     Refill:  0    senna (SENOKOT) 8.6 MG tablet     Sig: Take 2 tablets by mouth daily as needed for Constipation     Dispense:  60 tablet     Refill:  0    Magnesium Citrate 1.745 GM/30ML solution     Sig: Take 296 mLs by mouth once as needed for Constipation     Dispense:  296 mL     Refill:  0     DIAGNOSTIC RESULTS / EMERGENCYDEPARTMENT COURSE / MDM   LABS:  Labs Reviewed   CBC WITH AUTO DIFFERENTIAL - Abnormal; Notable for the following components:       Result Value    Hemoglobin 8.9 (*)     Hematocrit 31.2 (*)     MCV 69.5 (*)     MCH 19.8 (*)     RDW 19.4 (*)     All other components within normal limits   COMPREHENSIVE METABOLIC PANEL W/ REFLEX TO MG FOR LOW K - Abnormal; Notable for the following components:    Glucose 109 (*)     Bun/Cre Ratio 29 (*)     Sodium 134 (*)     Total Protein 6.2 (*)     All other components within normal limits   FERRITIN - Abnormal; Notable for the following components:    Ferritin 5 (*)     All other components within normal limits   TROPONIN - Abnormal; Notable for the following components:    Troponin, High Sensitivity 18 (*)     All other components within normal limits   TROPONIN - Abnormal; Notable for the following components:    Troponin, High Sensitivity 16 (*)     All other components within normal limits   BRAIN NATRIURETIC PEPTIDE   D-DIMER, QUANTITATIVE   VITAMIN B12 & FOLATE   IRON AND TIBC   URINALYSIS WITH MICROSCOPIC   TYPE AND SCREEN       RADIOLOGY:  CT ABDOMEN PELVIS W IV CONTRAST Additional Contrast? None    Result Date: 6/10/2022  EXAMINATION: CTA OF THE CHEST; CT OF THE ABDOMEN AND PELVIS WITH CONTRAST 6/10/2022 3:58 pm TECHNIQUE: CTA of the chest was performed after the administration of intravenous contrast.  Multiplanar reformatted images are provided for review. MIP images are provided for review. Automated exposure control, iterative reconstruction, and/or weight based adjustment of the mA/kV was utilized to reduce the radiation dose to as low as reasonably achievable.; CT of the abdomen and pelvis was performed with the administration of intravenous contrast. Multiplanar reformatted images are provided for review. Automated exposure control, iterative reconstruction, and/or weight based adjustment of the mA/kV was utilized to reduce the radiation dose to as low as reasonably achievable. COMPARISON: None.  HISTORY: ORDERING SYSTEM PROVIDED HISTORY: progressively worsening SOB with dizziness TECHNOLOGIST PROVIDED HISTORY: progressively worsening SOB with dizziness Decision Support Exception - unselect if not a suspected or confirmed emergency medical condition->Emergency Medical Condition (MA) Reason for Exam: anemia sob ; ORDERING SYSTEM PROVIDED HISTORY: anemia, SOB, recent unintentional weight loss, concern for free air under the diaphragm on CXR per radiologist TECHNOLOGIST PROVIDED HISTORY: anemia, SOB, recent unintentional weight loss, concern for free air under the diaphragm on CXR per radiologist Decision Support Exception - unselect if not a suspected or confirmed emergency medical condition->Emergency Medical Condition (MA) Reason for Exam: Anemia wt loss gastric bypass FINDINGS: CTA chest: Pulmonary Arteries: Pulmonary arteries are adequately opacified for evaluation. No evidence of intraluminal filling defect to suggest pulmonary embolism. Main pulmonary artery is normal in caliber. Mediastinum: No evidence of mediastinal lymphadenopathy. The heart and pericardium demonstrate no acute abnormality. There is no acute abnormality of the thoracic aorta. Lungs/pleura: The lungs are without acute process. No focal consolidation or pulmonary edema. No evidence of pleural effusion or pneumothorax. Upper Abdomen: Limited images of the upper abdomen are unremarkable. Soft Tissues/Bones: No acute bone or soft tissue abnormality. CT abdomen and pelvis: The liver, gallbladder, pancreas and spleen, adrenals, kidneys, the aorta and IVC appear stable. The IVC contains a filter which is tilted posteriorly. Status post gastric surgery. Bowel loops are prominent most pronounced large bowel loops. There is constipation and significant stool impaction in the rectum. Small bowel loops are mildly dilated. Ileus is likely. No evidence of lymphadenopathy or acute peritoneal findings. No evidence of free air. The uterus, adnexa and urinary bladder appear unremarkable. No acute musculoskeletal abnormality. No evidence of pulmonary embolism or acute pulmonary abnormality. Significant constipation and stool impaction in the rectum. Dilated large and to lesser degree small bowel loops possibly related to ileus. Status post gastric surgery. IVC filter tilted posteriorly however the filter is still intact. No evidence of intra-abdominal or pelvic abscess, adenopathy or tumor.      XR CHEST 1 VIEW    Result Date: 6/10/2022  EXAMINATION: ONE XRAY VIEW OF THE CHEST 6/10/2022 3:17 pm COMPARISON: 08/12/2017 HISTORY: ORDERING SYSTEM PROVIDED HISTORY: SOB fatigue TECHNOLOGIST PROVIDED HISTORY: SOB fatigue Reason for Exam: Pt eval for SOB FINDINGS: Cardiomediastinal silhouette and pulmonary vasculature are within normal limits. No focal airspace consolidation, pneumothorax, or pleural effusion. There is gas beneath the right and left hemidiaphragm, some of which may be bowel gas. Possibility of free air is difficult to exclude. No acute osseous abnormality. 1.  No acute intrathoracic process. 2.  Gas beneath the diaphragm. Some of this may be gas distended bowel but possibility of free air is not excluded and CT imaging of the abdomen/pelvis is recommended. Critical results were called by Dr. Duane Aldana to Dr. Viktoriya Alvarez on 6/10/2022 at 15:43. CT CHEST PULMONARY EMBOLISM W CONTRAST    Result Date: 6/10/2022  EXAMINATION: CTA OF THE CHEST; CT OF THE ABDOMEN AND PELVIS WITH CONTRAST 6/10/2022 3:58 pm TECHNIQUE: CTA of the chest was performed after the administration of intravenous contrast.  Multiplanar reformatted images are provided for review. MIP images are provided for review. Automated exposure control, iterative reconstruction, and/or weight based adjustment of the mA/kV was utilized to reduce the radiation dose to as low as reasonably achievable.; CT of the abdomen and pelvis was performed with the administration of intravenous contrast. Multiplanar reformatted images are provided for review. Automated exposure control, iterative reconstruction, and/or weight based adjustment of the mA/kV was utilized to reduce the radiation dose to as low as reasonably achievable. COMPARISON: None.  HISTORY: ORDERING SYSTEM PROVIDED HISTORY: progressively worsening SOB with dizziness TECHNOLOGIST PROVIDED HISTORY: progressively worsening SOB with dizziness Decision Support Exception - unselect if not a suspected or confirmed emergency medical condition->Emergency Medical Condition (MA) Reason for Exam: anemia sob ; ORDERING SYSTEM PROVIDED HISTORY: anemia, SOB, recent unintentional weight loss, concern for free air under the diaphragm on CXR per radiologist TECHNOLOGIST PROVIDED HISTORY: anemia, SOB, recent unintentional weight loss, concern for free air under the diaphragm on CXR per radiologist Decision Support Exception - unselect if not a suspected or confirmed emergency medical condition->Emergency Medical Condition (MA) Reason for Exam: Anemia wt loss gastric bypass FINDINGS: CTA chest: Pulmonary Arteries: Pulmonary arteries are adequately opacified for evaluation. No evidence of intraluminal filling defect to suggest pulmonary embolism. Main pulmonary artery is normal in caliber. Mediastinum: No evidence of mediastinal lymphadenopathy. The heart and pericardium demonstrate no acute abnormality. There is no acute abnormality of the thoracic aorta. Lungs/pleura: The lungs are without acute process. No focal consolidation or pulmonary edema. No evidence of pleural effusion or pneumothorax. Upper Abdomen: Limited images of the upper abdomen are unremarkable. Soft Tissues/Bones: No acute bone or soft tissue abnormality. CT abdomen and pelvis: The liver, gallbladder, pancreas and spleen, adrenals, kidneys, the aorta and IVC appear stable. The IVC contains a filter which is tilted posteriorly. Status post gastric surgery. Bowel loops are prominent most pronounced large bowel loops. There is constipation and significant stool impaction in the rectum. Small bowel loops are mildly dilated. Ileus is likely. No evidence of lymphadenopathy or acute peritoneal findings. No evidence of free air. The uterus, adnexa and urinary bladder appear unremarkable. No acute musculoskeletal abnormality. No evidence of pulmonary embolism or acute pulmonary abnormality. Significant constipation and stool impaction in the rectum.   Dilated large and to lesser degree small bowel loops possibly related to ileus. Status post gastric surgery. IVC filter tilted posteriorly however the filter is still intact. No evidence of intra-abdominal or pelvic abscess, adenopathy or tumor. EKG    EKG Interpretation    Interpreted by emergency department physician    Rhythm: normal sinus   Rate: normal  Axis: normal  Ectopy: none  Conduction: QTc 490  ST Segments: no acute change  T Waves: no acute change  Q Waves: none    Clinical Impression: non-specific EKG    All EKG's are interpreted by the Emergency Department Physician whoeither signs or Co-signs this chart in the absence of a cardiologist.    EMERGENCY DEPARTMENT COURSE:  ED Course as of 06/10/22 1800   Fri Ganesh 10, 2022   1541 CBC with Auto Differential(!):    WBC 3.7   RBC 4.49   Hemoglobin Quant 8.9(!)   Hematocrit 31.2(!)   MCV 69.5(!)   MCH 19.8(!)   MCHC 28.5   RDW 19.4(!)   Platelet Count 040   MPV 10.0   Seg Neutrophils PENDING   Lymphocytes PENDING   Monocytes PENDING   Eosinophils % PENDING   Basophils PENDING   Immature Granulocytes PENDING   Segs Absolute PENDING   Absolute Lymph # PENDING   Absolute Mono # PENDING   Absolute Eos # PENDING   Basophils Absolute PENDING   Absolute Immature Granulocyte PENDING  Chronic microcytic anemia [AO]   1 Rad partners calling CXR. Worried about free air under diaphragm  [AO]   C/ [address] CT contacted. No hx renal failure.  Pt to be next to CT do not need to wait for renal function from my standpoint benefits outweigh the risks  [AO]   1554 D-Dimer, Quant: <0.27 [AO]   1555 XR CHEST 1 VIEW [AO]   1600 Comprehensive Metabolic Panel w/ Reflex to MG(!):    GLUCOSE, FASTING,(!)   BUN,BUNPL 20   Creatinine 0.70   Bun/Cre Ratio 29(!)   CALCIUM, SERUM, 151766 9.4   Sodium 134(!)   Potassium 4.1   Chloride 100   CO2 22   Anion Gap 12   Alk Phos 85   ALT 16   AST 28   Bilirubin 0.38   Total Protein 6.2(!)   Albumin 3.8   GFR Non- >60   GFR  American >60   GFR Comment      [AO]   1606 Pro-BNP: 189 [AO]   1606 Troponin, High Sensitivity(!): 18 [AO]   1606 Ferritin(!): 5 [AO]   4432 ABO Rh: B POSITIVE [AO]   1627 CT ABDOMEN PELVIS W IV CONTRAST Additional Contrast? None [AO]   2794 CT CHEST PULMONARY EMBOLISM W CONTRAST [AO]   4580 Pt updated. Tonio had BM in 4 days which is chronic for her  [AO]   1640 Dr. Jacki Cooper in [place], [place] did initial gastric bypass in 2006. Hasn't followed up with him in at least 13 years as his office no longer takes her insurance.  [AO]   1726 Awaiting call back from dr Richard Packer. In surgery currently  [AO]   1758 Troponin, High Sensitivity(!): 16 [AO]      ED Course User Index  [AO] Dar Dec, DO       MDM  Number of Diagnoses or Management Options  Constipation, unspecified constipation type  Dyspnea on exertion  Ileus (HCC)  Iron deficiency anemia, unspecified iron deficiency anemia type  Diagnosis management comments: Cachectic chronically ill-appearing 39year-old presents to the emergency department fatigue, dyspnea on exertion. Sunken eyes. Conjunctiva somewhat pale, however not completely white. Abdomen soft nontender nondistended. Lung sounds clear bilaterally. Troponin negative, BNP negative, D-dimer negative. However on chest x-ray received a phone call from the radiologist concern for potential free air under the diaphragm. Underwent CT scan of the chest abdomen pelvis. No PE or other lung pathology. Distended colon concerning for ileus with constipation. Patient suffers from chronic constipation. She has undergone a gastric bypass back in 2006. However she cannot follow-up with the surgeon that she has seen before due to now being a [place] resident and [place] She is not seen him in the office for over 13 years. Reached out to our on-call surgeon for general advice in patient's regards. Suffers from chronic constipation and chronic nausea.   However she has been passing gas and tolerating oral foods and liquids at home. Feels though she can attempt to resolute her symptoms at home with a good bowel regimen. She was given information for the local bariatric surgeon, local hematology oncology , List of PCPs. She was instructed to contact her old surgeon to have her records forwarded to however she chooses to see. She is given prescription for ferrous sulfate, Colace, MiraLAX, Senokot, magnesium citrate. Return precautions discussed and she is understanding. Says she used to get iron infusions before and she was so symptomatic with her microcytic anemia I did give her an infusion of Venofer while she was in the emergency department. She states that she also suffers from chronic B12 deficiency, however this level did not come back while she was here and I told her that she would need to have it followed up on and get IM injections as needed. Amount and/or Complexity of Data Reviewed  Clinical lab tests: ordered and reviewed  Tests in the radiology section of CPT®: ordered and reviewed  Review and summarize past medical records: yes  Independent visualization of images, tracings, or specimens: yes    Patient Progress  Patient progress: stable      PROCEDURES:  Procedures     CONSULTS:  IP CONSULT TO GENERAL SURGERY    CRITICAL CARE:  NONE    FINAL IMPRESSION     1. Dyspnea on exertion    2. Iron deficiency anemia, unspecified iron deficiency anemia type    3. Ileus ([address]    4. Constipation, unspecified constipation type       DISPOSITION / PLAN   DISPOSITION Decision To Discharge 06/10/2022 05:51:43 PM      Evaluation and treatment course in the ED, and plan of care upon discharge was discussed in length with the patient. Patient had no further questions prior to being discharged and was instructed to return to the ED for new or worsening symptoms.   Any changes to existing medications or new prescriptions were reviewed with patient and they expressed understanding of how to correctly take their medications and the possible side effects. PATIENT REFERRED TO:  Jodelle Harada, APRN - NP  [address]  [place] [address]          [facility]  [address] [facility]  [phone]    As needed, If symptoms worsen    Harriet Calles MD  [facility] [address] [facility]  [phone]    Schedule an appointment as soon as possible for a visit   As needed for hematology (blood specialist) doctor    Billy Lechuga DO  [address] [address]  [phone]    Schedule an appointment as soon as possible for a visit   As needed for local bariatric surgery follow up    Sarah Duval DO            DISCHARGE MEDICATIONS:  New Prescriptions    DOCUSATE SODIUM (COLACE) 100 MG CAPSULE    Take 1 capsule by mouth 2 times daily as needed for Constipation    FERROUS SULFATE (IRON) 325 (65 FE) MG TABS    Take 1 tablet by mouth daily    MAGNESIUM CITRATE 1.745 GM/30ML SOLUTION    Take 296 mLs by mouth once as needed for Constipation    POLYETHYLENE GLYCOL (MIRALAX) 17 GM/SCOOP POWDER    Take 17 g by mouth daily for 7 days PRN constipation    SENNA (SENOKOT) 8.6 MG TABLET    Take 2 tablets by mouth daily as needed for Constipation       Rocio Alvarez DO  Emergency Medicine Physician    (Please note that portions of this note were completed with a voice recognition program.  Efforts were made to edit the dictations but occasionally words are mis-transcribed.)        Franko Castaneda 2621, DO  06/10/22 1800

## 2023-01-23 DIAGNOSIS — D50.9 IRON DEFICIENCY ANEMIA, UNSPECIFIED IRON DEFICIENCY ANEMIA TYPE: ICD-10-CM

## 2023-01-23 RX ORDER — SODIUM CHLORIDE 9 MG/ML
INJECTION, SOLUTION INTRAVENOUS CONTINUOUS
OUTPATIENT
Start: 2023-01-23

## 2023-01-24 ENCOUNTER — TELEPHONE (OUTPATIENT)
Dept: ONCOLOGY | Age: 43
End: 2023-01-24

## 2023-01-26 ENCOUNTER — TELEPHONE (OUTPATIENT)
Dept: ONCOLOGY | Age: 43
End: 2023-01-26

## 2023-01-26 NOTE — TELEPHONE ENCOUNTER
New order noted per Dr. Goddard Beti 1/19/23  DX: anemia  Venofer 100mg IV weekly x5 doses    Chart to  to be scanned and processed.

## 2023-01-31 NOTE — TELEPHONE ENCOUNTER
I TRIED TO CALL JOSHUA TO SCHEDULE HER IRON INFUSIONS AND HAD TO LEAVE A MESSAGE TO CALL THE OFFICE TO SCHEDULE.

## 2023-02-07 NOTE — TELEPHONE ENCOUNTER
I TRIED TO CALL JOSHUA TO SCHEDULE HER IRON INFUSIONS AND HAD TO LEAVE VM TO CALL THE OFFICE TO SCHEDULE. I WILL SEND PT A LETTER TO LET HER KNOW I HAVE BEEN TRYING TO GET HER SCHEDULED.

## 2023-02-14 NOTE — TELEPHONE ENCOUNTER
I TRIED TO CALL JOSHUA TO SCHEDULE HER IRON INFUSIONS AND HAD TO LEAVE A MESSAGE TO CALL THE OFFICE TO SCHEDULE. I WILL FAX THIS NOTE TO [employer] WHO ORDERED THE TX AND LET THEM KNOW THAT I HAVE NOT BEEN ABLE TO CONTACT THE PT. IF AND WHEN THE PT CALLS, AS LONG AS THE AUTH IS STILL VALID, PT WILL BE SCHEDULED THEN.

## 2023-07-06 VITALS
DIASTOLIC BLOOD PRESSURE: 78 MMHG | BODY MASS INDEX: 28.03 KG/M2 | OXYGEN SATURATION: 95 % | WEIGHT: 178.6 LBS | HEIGHT: 67 IN | TEMPERATURE: 97.8 F | SYSTOLIC BLOOD PRESSURE: 129 MMHG | RESPIRATION RATE: 18 BRPM | HEART RATE: 94 BPM

## 2023-07-06 PROCEDURE — 99281 EMR DPT VST MAYX REQ PHY/QHP: CPT

## 2023-07-06 ASSESSMENT — PAIN DESCRIPTION - PAIN TYPE: TYPE: ACUTE PAIN

## 2023-07-06 ASSESSMENT — PAIN - FUNCTIONAL ASSESSMENT: PAIN_FUNCTIONAL_ASSESSMENT: NONE - DENIES PAIN

## 2023-07-07 ENCOUNTER — HOSPITAL ENCOUNTER (EMERGENCY)
Age: 43
Discharge: HOME | End: 2023-07-07
Attending: EMERGENCY MEDICINE
Payer: MEDICAID

## 2023-07-07 DIAGNOSIS — M79.89 LEG SWELLING: ICD-10-CM

## 2023-07-07 DIAGNOSIS — Z53.21 ELOPED FROM EMERGENCY DEPARTMENT: Primary | ICD-10-CM

## 2023-07-07 NOTE — ED NOTES
Pt states her kids are in the car and she will come back tomorrow when she can find someone to watch them.       Chau Shepard RN  07/07/23 4035

## 2023-07-07 NOTE — ED PROVIDER NOTES
EMERGENCY DEPARTMENT ENCOUNTER    Pt Name: Ba Medina  MRN: [id number]  [address] 1980  Date of evaluation: 7/7/23  CHIEF COMPLAINT       Leg swelling. Patient eloped before treatment started. I never physically saw this patient. FINAL IMPRESSION      1. Eloped from emergency department    2. Leg swelling          DISPOSITION/PLAN   DISPOSITION Eloped - Left Before Treatment Complete 07/07/2023 01:28:39 AM      OUTPATIENT FOLLOW UP THE PATIENT:  No follow-up provider specified.     Marylen Fate, MD Auston Hasty, MD  07/07/23 9091

## 2023-07-21 ENCOUNTER — APPOINTMENT (OUTPATIENT)
Dept: CT IMAGING | Age: 43
DRG: 134 | End: 2023-07-21
Payer: MEDICAID

## 2023-07-21 ENCOUNTER — APPOINTMENT (OUTPATIENT)
Dept: GENERAL RADIOLOGY | Age: 43
DRG: 134 | End: 2023-07-21
Payer: MEDICAID

## 2023-07-21 ENCOUNTER — HOSPITAL ENCOUNTER (INPATIENT)
Age: 43
LOS: 2 days | Discharge: HOME HEALTH | DRG: 134 | End: 2023-07-23
Attending: EMERGENCY MEDICINE | Admitting: INTERNAL MEDICINE
Payer: MEDICAID

## 2023-07-21 DIAGNOSIS — R06.02 SOB (SHORTNESS OF BREATH): ICD-10-CM

## 2023-07-21 DIAGNOSIS — I26.99 PE (PULMONARY THROMBOEMBOLISM) (HCC): ICD-10-CM

## 2023-07-21 DIAGNOSIS — I26.99 ACUTE PULMONARY EMBOLISM WITHOUT ACUTE COR PULMONALE, UNSPECIFIED PULMONARY EMBOLISM TYPE (HCC): Primary | ICD-10-CM

## 2023-07-21 DIAGNOSIS — I26.99 PULMONARY EMBOLISM (HCC): ICD-10-CM

## 2023-07-21 PROBLEM — Z72.0 TOBACCO USE: Status: ACTIVE | Noted: 2023-07-21

## 2023-07-21 PROBLEM — R06.00 DYSPNEA: Status: ACTIVE | Noted: 2023-07-21

## 2023-07-21 PROBLEM — Z95.828 PRESENCE OF IVC FILTER: Status: ACTIVE | Noted: 2023-07-21

## 2023-07-21 PROBLEM — R60.0 BILATERAL LEG EDEMA: Status: ACTIVE | Noted: 2023-07-21

## 2023-07-21 LAB
ALBUMIN SERPL-MCNC: 2.2 G/DL (ref 3.5–5.2)
ALP SERPL-CCNC: 121 U/L (ref 35–104)
ALT SERPL-CCNC: 17 U/L (ref 5–33)
ANION GAP SERPL CALCULATED.3IONS-SCNC: 9 MMOL/L (ref 9–17)
AST SERPL-CCNC: 31 U/L
BASOPHILS # BLD: 0.04 K/UL (ref 0–0.2)
BASOPHILS NFR BLD: 1 % (ref 0–2)
BILIRUB DIRECT SERPL-MCNC: 0.1 MG/DL
BILIRUB INDIRECT SERPL-MCNC: 0.1 MG/DL (ref 0–1)
BILIRUB SERPL-MCNC: 0.2 MG/DL (ref 0.3–1.2)
BNP SERPL-MCNC: 752 PG/ML
BUN SERPL-MCNC: 16 MG/DL (ref 6–20)
BUN/CREAT SERPL: 27 (ref 9–20)
CALCIUM SERPL-MCNC: 7.8 MG/DL (ref 8.6–10.4)
CHLORIDE SERPL-SCNC: 103 MMOL/L (ref 98–107)
CO2 SERPL-SCNC: 22 MMOL/L (ref 20–31)
CREAT SERPL-MCNC: 0.6 MG/DL (ref 0.5–0.9)
D DIMER PPP FEU-MCNC: 1.18 UG/ML FEU (ref 0–0.59)
EOSINOPHIL # BLD: 0.08 K/UL (ref 0–0.44)
EOSINOPHILS RELATIVE PERCENT: 2 % (ref 1–4)
ERYTHROCYTE [DISTWIDTH] IN BLOOD BY AUTOMATED COUNT: 25 % (ref 11.8–14.4)
GFR SERPL CREATININE-BSD FRML MDRD: >60 ML/MIN/1.73M2
GLUCOSE SERPL-MCNC: 79 MG/DL (ref 70–99)
HCT VFR BLD AUTO: 34.7 % (ref 36.3–47.1)
HGB BLD-MCNC: 9.8 G/DL (ref 11.9–15.1)
IMM GRANULOCYTES # BLD AUTO: 0 K/UL (ref 0–0.3)
IMM GRANULOCYTES NFR BLD: 0 %
INR PPP: 1
LYMPHOCYTES NFR BLD: 1.28 K/UL (ref 1.1–3.7)
LYMPHOCYTES RELATIVE PERCENT: 32 % (ref 24–43)
MCH RBC QN AUTO: 21.4 PG (ref 25.2–33.5)
MCHC RBC AUTO-ENTMCNC: 28.2 G/DL (ref 28.4–34.8)
MCV RBC AUTO: 75.9 FL (ref 82.6–102.9)
MONOCYTES NFR BLD: 0.48 K/UL (ref 0.1–1.2)
MONOCYTES NFR BLD: 12 % (ref 3–12)
MORPHOLOGY: ABNORMAL
MYOGLOBIN SERPL-MCNC: 29 NG/ML (ref 25–58)
NEUTROPHILS NFR BLD: 53 % (ref 36–65)
NEUTS SEG NFR BLD: 2.12 K/UL (ref 1.5–8.1)
NRBC BLD-RTO: 0 PER 100 WBC
PARTIAL THROMBOPLASTIN TIME: 33.2 SEC (ref 23.9–33.8)
PLATELET # BLD AUTO: 291 K/UL (ref 138–453)
PMV BLD AUTO: 8.5 FL (ref 8.1–13.5)
POTASSIUM SERPL-SCNC: 5 MMOL/L (ref 3.7–5.3)
PROT SERPL-MCNC: 4.4 G/DL (ref 6.4–8.3)
PROTHROMBIN TIME: 12.8 SEC (ref 11.5–14.2)
RBC # BLD AUTO: 4.57 M/UL (ref 3.95–5.11)
RBC # BLD: ABNORMAL 10*6/UL
SODIUM SERPL-SCNC: 134 MMOL/L (ref 135–144)
TROPONIN I SERPL HS-MCNC: 10 NG/L (ref 0–14)
TROPONIN I SERPL HS-MCNC: 9 NG/L (ref 0–14)
WBC OTHER # BLD: 4 K/UL (ref 3.5–11.3)

## 2023-07-21 PROCEDURE — 6360000002 HC RX W HCPCS: Performed by: NURSE PRACTITIONER

## 2023-07-21 PROCEDURE — 6370000000 HC RX 637 (ALT 250 FOR IP): Performed by: NURSE PRACTITIONER

## 2023-07-21 PROCEDURE — 84484 ASSAY OF TROPONIN QUANT: CPT

## 2023-07-21 PROCEDURE — 93005 ELECTROCARDIOGRAM TRACING: CPT | Performed by: NURSE PRACTITIONER

## 2023-07-21 PROCEDURE — 83880 ASSAY OF NATRIURETIC PEPTIDE: CPT

## 2023-07-21 PROCEDURE — 93970 EXTREMITY STUDY: CPT

## 2023-07-21 PROCEDURE — 80076 HEPATIC FUNCTION PANEL: CPT

## 2023-07-21 PROCEDURE — 85027 COMPLETE CBC AUTOMATED: CPT

## 2023-07-21 PROCEDURE — 85730 THROMBOPLASTIN TIME PARTIAL: CPT

## 2023-07-21 PROCEDURE — 2580000003 HC RX 258: Performed by: NURSE PRACTITIONER

## 2023-07-21 PROCEDURE — 71260 CT THORAX DX C+: CPT

## 2023-07-21 PROCEDURE — 6360000004 HC RX CONTRAST MEDICATION: Performed by: NURSE PRACTITIONER

## 2023-07-21 PROCEDURE — 74022 RADEX COMPL AQT ABD SERIES: CPT

## 2023-07-21 PROCEDURE — 99222 1ST HOSP IP/OBS MODERATE 55: CPT | Performed by: NURSE PRACTITIONER

## 2023-07-21 PROCEDURE — 85610 PROTHROMBIN TIME: CPT

## 2023-07-21 PROCEDURE — 2060000000 HC ICU INTERMEDIATE R&B

## 2023-07-21 PROCEDURE — 99285 EMERGENCY DEPT VISIT HI MDM: CPT

## 2023-07-21 PROCEDURE — 6360000002 HC RX W HCPCS: Performed by: EMERGENCY MEDICINE

## 2023-07-21 PROCEDURE — 85379 FIBRIN DEGRADATION QUANT: CPT

## 2023-07-21 PROCEDURE — 80048 BASIC METABOLIC PNL TOTAL CA: CPT

## 2023-07-21 PROCEDURE — 83874 ASSAY OF MYOGLOBIN: CPT

## 2023-07-21 RX ORDER — ACETAMINOPHEN 325 MG/1
650 TABLET ORAL EVERY 6 HOURS PRN
Status: DISCONTINUED | OUTPATIENT
Start: 2023-07-21 | End: 2023-07-23 | Stop reason: HOSPADM

## 2023-07-21 RX ORDER — POLYETHYLENE GLYCOL 3350 17 G/17G
17 POWDER, FOR SOLUTION ORAL DAILY PRN
Status: DISCONTINUED | OUTPATIENT
Start: 2023-07-21 | End: 2023-07-23 | Stop reason: HOSPADM

## 2023-07-21 RX ORDER — NICOTINE 21 MG/24HR
1 PATCH, TRANSDERMAL 24 HOURS TRANSDERMAL DAILY
Status: DISCONTINUED | OUTPATIENT
Start: 2023-07-21 | End: 2023-07-23 | Stop reason: HOSPADM

## 2023-07-21 RX ORDER — HYDROXYZINE HYDROCHLORIDE 10 MG/1
10 TABLET, FILM COATED ORAL NIGHTLY
COMMUNITY
End: 2023-07-21

## 2023-07-21 RX ORDER — ONDANSETRON 4 MG/1
4 TABLET, ORALLY DISINTEGRATING ORAL EVERY 8 HOURS PRN
Status: DISCONTINUED | OUTPATIENT
Start: 2023-07-21 | End: 2023-07-23 | Stop reason: HOSPADM

## 2023-07-21 RX ORDER — HEPARIN SODIUM 1000 [USP'U]/ML
80 INJECTION, SOLUTION INTRAVENOUS; SUBCUTANEOUS ONCE
Status: COMPLETED | OUTPATIENT
Start: 2023-07-21 | End: 2023-07-21

## 2023-07-21 RX ORDER — SODIUM CHLORIDE 9 MG/ML
INJECTION, SOLUTION INTRAVENOUS CONTINUOUS
Status: DISCONTINUED | OUTPATIENT
Start: 2023-07-21 | End: 2023-07-23

## 2023-07-21 RX ORDER — SODIUM CHLORIDE 0.9 % (FLUSH) 0.9 %
10 SYRINGE (ML) INJECTION PRN
Status: DISCONTINUED | OUTPATIENT
Start: 2023-07-21 | End: 2023-07-23 | Stop reason: HOSPADM

## 2023-07-21 RX ORDER — ALPRAZOLAM 1 MG/1
1 TABLET ORAL
Status: COMPLETED | OUTPATIENT
Start: 2023-07-21 | End: 2023-07-21

## 2023-07-21 RX ORDER — HEPARIN SODIUM 10000 [USP'U]/100ML
5-30 INJECTION, SOLUTION INTRAVENOUS CONTINUOUS
Status: DISCONTINUED | OUTPATIENT
Start: 2023-07-21 | End: 2023-07-23

## 2023-07-21 RX ORDER — SODIUM CHLORIDE 9 MG/ML
INJECTION, SOLUTION INTRAVENOUS PRN
Status: DISCONTINUED | OUTPATIENT
Start: 2023-07-21 | End: 2023-07-23 | Stop reason: HOSPADM

## 2023-07-21 RX ORDER — MAGNESIUM SULFATE IN WATER 40 MG/ML
2000 INJECTION, SOLUTION INTRAVENOUS PRN
Status: DISCONTINUED | OUTPATIENT
Start: 2023-07-21 | End: 2023-07-23 | Stop reason: HOSPADM

## 2023-07-21 RX ORDER — 0.9 % SODIUM CHLORIDE 0.9 %
80 INTRAVENOUS SOLUTION INTRAVENOUS ONCE
Status: COMPLETED | OUTPATIENT
Start: 2023-07-21 | End: 2023-07-21

## 2023-07-21 RX ORDER — ALPRAZOLAM 1 MG/1
1 TABLET ORAL AS NEEDED
Status: DISCONTINUED | OUTPATIENT
Start: 2023-07-21 | End: 2023-07-21

## 2023-07-21 RX ORDER — SODIUM CHLORIDE 0.9 % (FLUSH) 0.9 %
5-40 SYRINGE (ML) INJECTION EVERY 12 HOURS SCHEDULED
Status: DISCONTINUED | OUTPATIENT
Start: 2023-07-21 | End: 2023-07-23 | Stop reason: HOSPADM

## 2023-07-21 RX ORDER — HYDROXYZINE 50 MG/1
50 TABLET, FILM COATED ORAL NIGHTLY
Status: ON HOLD | COMMUNITY
Start: 2023-06-22 | End: 2023-07-22

## 2023-07-21 RX ORDER — POTASSIUM CHLORIDE 7.45 MG/ML
10 INJECTION INTRAVENOUS PRN
Status: DISCONTINUED | OUTPATIENT
Start: 2023-07-21 | End: 2023-07-23 | Stop reason: HOSPADM

## 2023-07-21 RX ORDER — ALPRAZOLAM 1 MG/1
1 TABLET ORAL AS NEEDED
COMMUNITY

## 2023-07-21 RX ORDER — HYDROXYZINE HYDROCHLORIDE 25 MG/1
50 TABLET, FILM COATED ORAL NIGHTLY
Status: DISCONTINUED | OUTPATIENT
Start: 2023-07-21 | End: 2023-07-23 | Stop reason: HOSPADM

## 2023-07-21 RX ORDER — ACETAMINOPHEN 650 MG/1
650 SUPPOSITORY RECTAL EVERY 6 HOURS PRN
Status: DISCONTINUED | OUTPATIENT
Start: 2023-07-21 | End: 2023-07-23 | Stop reason: HOSPADM

## 2023-07-21 RX ORDER — POTASSIUM CHLORIDE 20 MEQ/1
40 TABLET, EXTENDED RELEASE ORAL PRN
Status: DISCONTINUED | OUTPATIENT
Start: 2023-07-21 | End: 2023-07-23 | Stop reason: HOSPADM

## 2023-07-21 RX ORDER — SODIUM CHLORIDE 0.9 % (FLUSH) 0.9 %
5-40 SYRINGE (ML) INJECTION PRN
Status: DISCONTINUED | OUTPATIENT
Start: 2023-07-21 | End: 2023-07-23 | Stop reason: HOSPADM

## 2023-07-21 RX ORDER — HEPARIN SODIUM 1000 [USP'U]/ML
40 INJECTION, SOLUTION INTRAVENOUS; SUBCUTANEOUS PRN
Status: DISCONTINUED | OUTPATIENT
Start: 2023-07-21 | End: 2023-07-23 | Stop reason: ALTCHOICE

## 2023-07-21 RX ORDER — ONDANSETRON 2 MG/ML
4 INJECTION INTRAMUSCULAR; INTRAVENOUS EVERY 6 HOURS PRN
Status: DISCONTINUED | OUTPATIENT
Start: 2023-07-21 | End: 2023-07-23 | Stop reason: HOSPADM

## 2023-07-21 RX ORDER — DEXTROAMPHETAMINE SACCHARATE, AMPHETAMINE ASPARTATE, DEXTROAMPHETAMINE SULFATE AND AMPHETAMINE SULFATE 3.75; 3.75; 3.75; 3.75 MG/1; MG/1; MG/1; MG/1
15 TABLET ORAL 2 TIMES DAILY
Status: DISCONTINUED | OUTPATIENT
Start: 2023-07-21 | End: 2023-07-23 | Stop reason: HOSPADM

## 2023-07-21 RX ORDER — BUPRENORPHINE AND NALOXONE 8; 2 MG/1; MG/1
2 FILM, SOLUBLE BUCCAL; SUBLINGUAL DAILY
Status: DISCONTINUED | OUTPATIENT
Start: 2023-07-22 | End: 2023-07-23 | Stop reason: HOSPADM

## 2023-07-21 RX ORDER — HEPARIN SODIUM 1000 [USP'U]/ML
80 INJECTION, SOLUTION INTRAVENOUS; SUBCUTANEOUS PRN
Status: DISCONTINUED | OUTPATIENT
Start: 2023-07-21 | End: 2023-07-23 | Stop reason: ALTCHOICE

## 2023-07-21 RX ADMIN — SODIUM CHLORIDE 80 ML: 9 INJECTION, SOLUTION INTRAVENOUS at 16:58

## 2023-07-21 RX ADMIN — SODIUM CHLORIDE, PRESERVATIVE FREE 10 ML: 5 INJECTION INTRAVENOUS at 16:58

## 2023-07-21 RX ADMIN — ALPRAZOLAM 1 MG: 1 TABLET ORAL at 23:59

## 2023-07-21 RX ADMIN — IOPAMIDOL 75 ML: 755 INJECTION, SOLUTION INTRAVENOUS at 16:57

## 2023-07-21 RX ADMIN — HEPARIN SODIUM 18 UNITS/KG/HR: 10000 INJECTION, SOLUTION INTRAVENOUS at 22:09

## 2023-07-21 RX ADMIN — SODIUM CHLORIDE: 9 INJECTION, SOLUTION INTRAVENOUS at 22:03

## 2023-07-21 RX ADMIN — HEPARIN SODIUM 6770 UNITS: 1000 INJECTION INTRAVENOUS; SUBCUTANEOUS at 22:07

## 2023-07-21 RX ADMIN — HYDROXYZINE HYDROCHLORIDE 50 MG: 25 TABLET, FILM COATED ORAL at 21:55

## 2023-07-21 ASSESSMENT — ENCOUNTER SYMPTOMS
VOMITING: 0
BACK PAIN: 0
CONSTIPATION: 1
COLOR CHANGE: 0
SORE THROAT: 0
ABDOMINAL PAIN: 0
CHEST TIGHTNESS: 1
SHORTNESS OF BREATH: 1
COUGH: 0
DIARRHEA: 0
NAUSEA: 0

## 2023-07-21 ASSESSMENT — PAIN - FUNCTIONAL ASSESSMENT: PAIN_FUNCTIONAL_ASSESSMENT: NONE - DENIES PAIN

## 2023-07-21 NOTE — ED NOTES
Report given to [address] [place] [address] for transfer of care     Callie Cornelius RN  07/21/23 2536

## 2023-07-21 NOTE — ED PROVIDER NOTES
EMERGENCY DEPARTMENT ENCOUNTER   ATTENDING ATTESTATION     Pt Name: Radha Hernandez  MRN: [id number]  [address] 1980  Date of evaluation: 7/21/23   Radha Hernandez is a 43 y.o. female with CC: Leg Swelling (Bilat, states traveling up legs)    MDM:   Patient is a 41-year-old female who presented to the emergency department secondary to bilateral leg pain. Elevated D-dimer. Orders for ultrasound of bilateral extremities as well as CT chest.  Ultrasound of the bilateral extremities negative for DVT. CTA chest positive for small right-sided pulmonary emboli no evidence of right heart strain. However bilateral pleural effusions. Results discussed with patient. Patient initiated on heparin discussed with the internal medicine service who agreed to admit for further evaluation and treatment. Patient 100% on room air no acute respiratory distress  This visit was performed by both a physician and an APC. I personally evaluated and examined the patient. I performed all aspects of the MDM as documented. CRITICAL CARE:   CRITICAL CARE TIME     Due to the high probability of sudden and clinically significant deterioration in the patient's condition she required highest level of my preparedness to intervene urgently. I provided critical care time including documentation time, medication orders and management, reevaluation, vital sign assessment, ordering and reviewing of of lab tests ordering and reviewing of x-ray studies, and admission orders. Aggregate critical care time is between 45 minutes including only time during which I was engaged in work directly related to her care and did not include time spent treating other patients simultaneously. EKG:  All EKG's are interpreted by the Emergency Department Physician who either signs or Co-signs this chart in the absence of a cardiologist.      RADIOLOGY:All plain film, CT, MRI, and formal ultrasound images (except ED bedside ultrasound) are read by the

## 2023-07-21 NOTE — H&P
[facility]  Office: [address]26, DO, Yudi Robert, DO, Poncho Donovan, DO, Sven Walker, DO, Glenn Andersen MD, Enriqueta Ramos MD, Geena Ceja MD, Joshua Ramos MD,  Corbin Victor MD, Karen Haywood MD, Kathleen Hanna, DO, Bob Castro MD,  Shalom Morrison MD, Kevin Eng MD, Dejon Juarez, DO, Jessica Puckett MD,  Jermain Sherwood, DO, Pavel Chavez MD, Steven Zabala MD, Sofy Solis MD, Ines Boles MD,  Vidya Jones MD, Bharat Hartley MD, Venu Christianson, , Sada Parham MD,  Madison Shaffer MD, Blu Adan, CNP,  Alicia Taylor, CNP, Mayela Coleman, CNP, Randy Burks, CNP,  Laurie Carranza, DNP, Prabhakar Saravia, CNP, Lottie Wisdom, CNP, Montrell Del Rio, CNP, Luanne Cooper, CNP, Ambika Delacruz, CNP, Tamica Epperson PA-C, Aiden Moore, CNS, Marquis Ge, CNP, Brian Villalpando, GENESIS Flor    HISTORY AND PHYSICAL EXAMINATION            Date:   7/21/2023  Patient name:  Paige Kwong  Date of admission:  7/21/2023  1:37 PM  MRN:   [id number]  Account:  [de-identified]  YOB: 1980  PCP:    KETTY Casillas NP  Room:   [place] [address]  Code Status:    Full    Chief Complaint:     Chief Complaint   Patient presents with    Leg Swelling     Bilat, states traveling up legs     History Obtained From:     patient, electronic medical record    History of Present Illness:     Patient presents to the emergency room today with complaints of bilateral leg edema. Patient states that her bilateral leg edema started approximately 2 weeks ago. She was experiencing intermittent weeping edema as well. Patient had attempted to take over-the-counter diuretics as well as elevating her legs when sitting which would occasionally help. Patient states that she noted the edema was traveling up her legs and decided to come to the emergency room for further evaluation and treatment.
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
## 2023-07-22 ENCOUNTER — APPOINTMENT (OUTPATIENT)
Age: 43
DRG: 134 | End: 2023-07-22
Payer: MEDICAID

## 2023-07-22 PROBLEM — Z98.84 HISTORY OF GASTRIC BYPASS: Status: ACTIVE | Noted: 2023-07-22

## 2023-07-22 PROBLEM — F41.9 ANXIETY: Status: ACTIVE | Noted: 2023-07-22

## 2023-07-22 LAB
ANION GAP SERPL CALCULATED.3IONS-SCNC: 6 MMOL/L (ref 9–17)
ANTI-XA UNFRAC HEPARIN: 0.47 IU/L (ref 0.3–0.7)
ANTI-XA UNFRAC HEPARIN: 0.63 IU/L (ref 0.3–0.7)
ANTI-XA UNFRAC HEPARIN: 0.78 IU/L (ref 0.3–0.7)
BASOPHILS # BLD: 0.06 K/UL (ref 0–0.2)
BASOPHILS NFR BLD: 1 %
BUN SERPL-MCNC: 12 MG/DL (ref 6–20)
BUN/CREAT SERPL: 17 (ref 9–20)
CALCIUM SERPL-MCNC: 7.4 MG/DL (ref 8.6–10.4)
CHLORIDE SERPL-SCNC: 108 MMOL/L (ref 98–107)
CO2 SERPL-SCNC: 24 MMOL/L (ref 20–31)
CREAT SERPL-MCNC: 0.7 MG/DL (ref 0.5–0.9)
DATE, STOOL #1: NORMAL
ECHO BSA: 1.88 M2
ECHO EST RA PRESSURE: 3 MMHG
ECHO PULMONARY ARTERY SYSTOLIC PRESSURE (PASP): 19 MMHG
ECHO TV REGURGITANT PEAK GRADIENT: 17 MMHG
EKG ATRIAL RATE: 71 BPM
EKG P AXIS: 98 DEGREES
EKG P-R INTERVAL: 154 MS
EKG Q-T INTERVAL: 380 MS
EKG QRS DURATION: 80 MS
EKG QTC CALCULATION (BAZETT): 412 MS
EKG R AXIS: -20 DEGREES
EKG T AXIS: -6 DEGREES
EKG VENTRICULAR RATE: 71 BPM
EOSINOPHIL # BLD: 0.24 K/UL (ref 0–0.4)
EOSINOPHILS RELATIVE PERCENT: 4 % (ref 1–4)
ERYTHROCYTE [DISTWIDTH] IN BLOOD BY AUTOMATED COUNT: 24.7 % (ref 11.8–14.4)
FOLATE SERPL-MCNC: 10.1 NG/ML
GFR SERPL CREATININE-BSD FRML MDRD: >60 ML/MIN/1.73M2
GLUCOSE SERPL-MCNC: 90 MG/DL (ref 70–99)
HCT VFR BLD AUTO: 29.2 % (ref 36.3–47.1)
HEMOCCULT SP1 STL QL: NEGATIVE
HGB BLD-MCNC: 8.2 G/DL (ref 11.9–15.1)
IMM GRANULOCYTES # BLD AUTO: 0 K/UL (ref 0–0.3)
IMM GRANULOCYTES NFR BLD: 0 %
IRON SATN MFR SERPL: 12 % (ref 20–55)
IRON SERPL-MCNC: 19 UG/DL (ref 37–145)
LYMPHOCYTES NFR BLD: 2.53 K/UL (ref 1–4.8)
LYMPHOCYTES RELATIVE PERCENT: 43 % (ref 24–44)
MCH RBC QN AUTO: 21.5 PG (ref 25.2–33.5)
MCHC RBC AUTO-ENTMCNC: 28.1 G/DL (ref 28.4–34.8)
MCV RBC AUTO: 76.6 FL (ref 82.6–102.9)
MONOCYTES NFR BLD: 0.65 K/UL (ref 0.2–0.8)
MONOCYTES NFR BLD: 11 % (ref 1–7)
MORPHOLOGY: ABNORMAL
MORPHOLOGY: ABNORMAL
NEUTROPHILS NFR BLD: 41 % (ref 36–66)
NEUTS SEG NFR BLD: 2.42 K/UL (ref 1.8–7.7)
NRBC BLD-RTO: 0 PER 100 WBC
PLATELET # BLD AUTO: 271 K/UL (ref 138–453)
PMV BLD AUTO: 9 FL (ref 8.1–13.5)
POTASSIUM SERPL-SCNC: 4.3 MMOL/L (ref 3.7–5.3)
RBC # BLD AUTO: 3.81 M/UL (ref 3.95–5.11)
SODIUM SERPL-SCNC: 138 MMOL/L (ref 135–144)
T4 FREE SERPL-MCNC: 0.9 NG/DL (ref 0.9–1.7)
TIBC SERPL-MCNC: 163 UG/DL (ref 250–450)
TIME, STOOL #1: 1208
TSH SERPL DL<=0.05 MIU/L-ACNC: 10.14 UIU/ML (ref 0.3–5)
UNSATURATED IRON BINDING CAPACITY: 144 UG/DL (ref 112–347)
VIT B12 SERPL-MCNC: 542 PG/ML (ref 232–1245)
WBC OTHER # BLD: 5.9 K/UL (ref 3.5–11.3)

## 2023-07-22 PROCEDURE — 82270 OCCULT BLOOD FECES: CPT

## 2023-07-22 PROCEDURE — 2060000000 HC ICU INTERMEDIATE R&B

## 2023-07-22 PROCEDURE — 82607 VITAMIN B-12: CPT

## 2023-07-22 PROCEDURE — 36415 COLL VENOUS BLD VENIPUNCTURE: CPT

## 2023-07-22 PROCEDURE — 6370000000 HC RX 637 (ALT 250 FOR IP): Performed by: NURSE PRACTITIONER

## 2023-07-22 PROCEDURE — 85027 COMPLETE CBC AUTOMATED: CPT

## 2023-07-22 PROCEDURE — 6370000000 HC RX 637 (ALT 250 FOR IP): Performed by: INTERNAL MEDICINE

## 2023-07-22 PROCEDURE — 99232 SBSQ HOSP IP/OBS MODERATE 35: CPT | Performed by: INTERNAL MEDICINE

## 2023-07-22 PROCEDURE — 80048 BASIC METABOLIC PNL TOTAL CA: CPT

## 2023-07-22 PROCEDURE — 83550 IRON BINDING TEST: CPT

## 2023-07-22 PROCEDURE — 84439 ASSAY OF FREE THYROXINE: CPT

## 2023-07-22 PROCEDURE — 6360000002 HC RX W HCPCS: Performed by: NURSE PRACTITIONER

## 2023-07-22 PROCEDURE — 84443 ASSAY THYROID STIM HORMONE: CPT

## 2023-07-22 PROCEDURE — 2580000003 HC RX 258: Performed by: NURSE PRACTITIONER

## 2023-07-22 PROCEDURE — 93306 TTE W/DOPPLER COMPLETE: CPT

## 2023-07-22 PROCEDURE — 82746 ASSAY OF FOLIC ACID SERUM: CPT

## 2023-07-22 PROCEDURE — 85520 HEPARIN ASSAY: CPT

## 2023-07-22 PROCEDURE — 83540 ASSAY OF IRON: CPT

## 2023-07-22 RX ORDER — ALBUTEROL SULFATE 90 UG/1
2 AEROSOL, METERED RESPIRATORY (INHALATION) EVERY 6 HOURS PRN
COMMUNITY

## 2023-07-22 RX ORDER — BUPRENORPHINE AND NALOXONE 8; 2 MG/1; MG/1
2 FILM, SOLUBLE BUCCAL; SUBLINGUAL DAILY
COMMUNITY

## 2023-07-22 RX ORDER — ALPRAZOLAM 1 MG/1
1 TABLET ORAL 3 TIMES DAILY PRN
Status: DISCONTINUED | OUTPATIENT
Start: 2023-07-22 | End: 2023-07-23 | Stop reason: HOSPADM

## 2023-07-22 RX ORDER — OMEPRAZOLE 20 MG/1
20 CAPSULE, DELAYED RELEASE ORAL DAILY
COMMUNITY

## 2023-07-22 RX ORDER — HYDROXYZINE 50 MG/1
100 TABLET, FILM COATED ORAL
COMMUNITY

## 2023-07-22 RX ORDER — PROPRANOLOL HYDROCHLORIDE 10 MG/1
10 TABLET ORAL 2 TIMES DAILY
Status: ON HOLD | COMMUNITY
End: 2023-07-23 | Stop reason: HOSPADM

## 2023-07-22 RX ORDER — PANTOPRAZOLE SODIUM 40 MG/1
40 TABLET, DELAYED RELEASE ORAL
Status: DISCONTINUED | OUTPATIENT
Start: 2023-07-22 | End: 2023-07-23 | Stop reason: HOSPADM

## 2023-07-22 RX ORDER — DEXTROAMPHETAMINE SACCHARATE, AMPHETAMINE ASPARTATE, DEXTROAMPHETAMINE SULFATE AND AMPHETAMINE SULFATE 7.5; 7.5; 7.5; 7.5 MG/1; MG/1; MG/1; MG/1
30 TABLET ORAL 2 TIMES DAILY
COMMUNITY

## 2023-07-22 RX ADMIN — SODIUM CHLORIDE: 9 INJECTION, SOLUTION INTRAVENOUS at 19:04

## 2023-07-22 RX ADMIN — HYDROXYZINE HYDROCHLORIDE 50 MG: 25 TABLET, FILM COATED ORAL at 21:32

## 2023-07-22 RX ADMIN — BUPRENORPHINE AND NALOXONE 2 FILM: 8; 2 FILM BUCCAL; SUBLINGUAL at 17:35

## 2023-07-22 RX ADMIN — HEPARIN SODIUM 17 UNITS/KG/HR: 10000 INJECTION, SOLUTION INTRAVENOUS at 19:06

## 2023-07-22 RX ADMIN — ALPRAZOLAM 1 MG: 1 TABLET ORAL at 21:32

## 2023-07-22 RX ADMIN — PANTOPRAZOLE SODIUM 40 MG: 40 TABLET, DELAYED RELEASE ORAL at 17:34

## 2023-07-22 RX ADMIN — ALPRAZOLAM 1 MG: 1 TABLET ORAL at 11:01

## 2023-07-22 NOTE — PROGRESS NOTES
[] Medication Reconciliation was completed and the patient's home medication list was verified. The Med List Status is \"Complete\". The following sources were used to assist with Medication Reconciliation:    [] Patient had a list of medications which was transcribed into the EHR. [] Patient provided bottles of their medications    [x] Home medications reviewed and confirmed with pt. Xanax added to home med list. Pt states she takes 1mg PRN    [] Contacted patient's pharmacy to confirm home medications    [] Contacted patient's physician office to confirm home medications    [] Medical Records from another facility and/or Care Everywhere were reviewed      [x] There are one or more home medications that need clarification before Medication Reconciliation can be completed. The Med List Status has been marked as In Progress. To assist with Home Medication Reconciliation the following actions have been taken:    [x] Pharmacy medication reconciliation service requested.  (Note: This can be done by sending a Perfect Serve message to [facility] Pharmacist or by Kathya Lebron [phone].)  [] Family requested to bring medications into the hospital  [] Family requested to call hospital with medication list  [] Message left with physician office  [] Request for medical records made to   [] Other

## 2023-07-22 NOTE — PROGRESS NOTES
Pt received Heparin bolus & Heparin gtt initiated. Anti-xa draw ordered for 0430.  Heparin running at 18 units/kg/hr

## 2023-07-22 NOTE — PLAN OF CARE
Problem: Discharge Planning  Goal: Discharge to home or other facility with appropriate resources  Outcome: Progressing  Flowsheets (Taken 7/21/2023 2300)  Discharge to home or other facility with appropriate resources:   Identify barriers to discharge with patient and caregiver   Arrange for needed discharge resources and transportation as appropriate   Identify discharge learning needs (meds, wound care, etc)     Problem: Safety - Adult  Goal: Free from fall injury  Outcome: Progressing

## 2023-07-22 NOTE — PROGRESS NOTES
Pt arrived from ED via stretcher. Pt A&Ox4 and on RA. Pt ambulated independently from stretcher to bed. Call light and bedside table within reach. Telemetry on. Vitals stable, but low degree temp 99.6.

## 2023-07-22 NOTE — PROGRESS NOTES
Pt remained anxious throughout the shift. Pt received nicotine patch per request, that is placed on rt upper arm. Pt also received 1 mg Xanax at 25-23-76-22 for stated anxiety. Pt to have echo and VL lower bilat venous john scan done today. Pt's adderall could not be given because pharmacy stated that this is not available her, and pt must supply her own. Pt has bouts of shakiness and skin picking d/t drug use hx.  Will continue with care plan

## 2023-07-22 NOTE — PROGRESS NOTES
[facility]  Office: [address], DO, Tesfaye Gambleter, DO, Vaishali Villeda, DO, Sarah Alert Blood, DO, Thong Jones MD, Myke Rodgers MD, Jennifer Patterson MD, Saige Hamm MD,  Margie Hong MD, Sahil Domínguez MD, Gene Cleaning, DO, Wai Deal MD,  Salem Aase, MD, Karolee Holter, MD, Rose Anand, DO, Jazmin Mendez MD,  Letitia Bui, DO, David Caro MD, Ashley Rodriguez MD, Hussein Haq MD, Malissa Stahl MD,  Cristino Bergeron MD, Zeus Del Rio MD, Tab Cavanaugh, DO, Lara Hare MD,  Nahun Burch MD, Noel Matthews, CNP,  Gm Hassan, CNP, Yves Mckeon, CNP, Lizy Sears, CNP,  Tristan Samuel, [facility], Brad Ovalle, CNP, Swetha Ortiz, CNP, Griselda Jenkins, CNP, Evangelist Lyon, CNP, Darin Cornell, CNP, Yaz Simmons, PA-C, Fili Cervantes, CNS, Nella Miller, CNP, Sharmaine Asher, [address]    Progress Note    7/22/2023    12:02 PM    Name:   Leah James  MRN:     [id number]     Acct:      [de-identified]   Room:   [address] [place] [address] Day:  1  Admit Date:  7/21/2023  1:37 PM    PCP:   KETTY Pop NP  Code Status:  Full Code    Subjective:     C/C:   Chief Complaint   Patient presents with    Leg Swelling     Bilat, states traveling up legs     Interval History Status:  Currently on heparin drip for PE, venous Doppler result for lower extremities pending. Still has mild tightness in chest although better than before  Leg swelling is slightly better  Denies fever or chills today  Feels very anxious, takes Xanax at home as needed  Has chronic iron deficiency anemia due to gastric bypass      Brief History:   Patient presents to the emergency room today with complaints of bilateral leg edema. Patient states that her bilateral leg edema started approximately 2 weeks ago. She was experiencing intermittent weeping edema as well.   Patient had attempted to take

## 2023-07-22 NOTE — PLAN OF CARE
Pt is alert and oriented. Leg swelling is slightly better. less anxious during this shift. in the beginning of the shift she was complaining from chest pain in the left side. she has given Lucillie Idol still running.had an echo in the morning and it was normal.will continue with care plan. Safety measures remaninig in place.

## 2023-07-22 NOTE — PROGRESS NOTES
Transitions of Care Pharmacy Service   Medication Review    The patient's list of current home medications has been reviewed. Source(s) of information: [employer] refill report, OARRS, Patient    Based on information provided by the above source(s), I have updated the patient's home med list as described below. Please review the ACTION REQUESTED section of this note for any discrepancies on current hospital orders. I changed or updated the following medications on the patient's home medication list:  Discontinued Bupropion 150 SR   Docusate Sodium 100 mg   Ferrous Sulfate 325 mg     Added Propranolol 10 mg BID - has not started yet - was prescribed by her pain management doctor  Albuterol HFA inhaler PRN  Omeprazole 20 mg daily before breakfast      Adjusted   Adderall 15 mg - adjusted strength to 30 mg BID   Suboxone 8mg-2mg - added instructions of take 2 films and dissolve under tongue daily  Hydroxyzine 50 mg - adjusted SIG from 1 tablet to 2 tablets at every bedtime     Other Notes Patient states she recently took herself off her citalopram 20 mg daily. She states she does not feel a difference without it and is going to ask her PCP to take it off her list.   Patient is requesting nasal saline for sinuses, lansoprazole instead of omeprazole, and lubricant eye drops - please review and add as appropriate          PROVIDER ACTION REQUESTED  Medications that need to be addressed by a physician/nurse practitioner:    Medication Action Requested     Omeprazole      Please add as appropriate. Patient is specifically asking for the oral disintegrating tablet lansoprazole. Alprazolam      Please adjust to 1 tablet daily PRN for anxiety. Hydroxyzine     Please adjust to 2 tablets nightly as appropriate. Propranolol   Albuterol HFA PRN     Please add as appropriate. Please feel free to call me with any questions about this encounter. Thank you.     Sera Macario, [facility], PharmD

## 2023-07-22 NOTE — CARE COORDINATION
Case Management Assessment  Initial Evaluation    Date/Time of Evaluation: 7/22/2023 12:25 PM  Assessment Completed by: Jonan Gonzalez RN    If patient is discharged prior to next notation, then this note serves as note for discharge by case management. Patient Name: Kwasi Mendoza                   YOB: 1980  Diagnosis: Pulmonary embolism on right [facility]) [I26.99]                   Date / Time: 7/21/2023  1:37 PM    Patient Admission Status: Inpatient   Readmission Risk (Low < 19, Mod (19-27), High > 27): Readmission Risk Score: 10.2    Current PCP: KETTY Gannon NP  PCP verified by CM? Yes (LOV was in march)    Chart Reviewed: Yes      History Provided by: Patient  Patient Orientation: Alert and Oriented    Patient Cognition: Alert    Hospitalization in the last 30 days (Readmission):  No    If yes, Readmission Assessment in CM Navigator will be completed. Advance Directives:      Code Status: Full Code   Patient's Primary Decision Maker is: Legal Next of Kin      Discharge Planning:    Patient lives with: Spouse/Significant Other Type of Home: House  Primary Care Giver: Self  Patient Support Systems include: Spouse/Significant Other   Current Financial resources: None  Current community resources: None  Current services prior to admission: None            Current DME:              Type of Home Care services:  None    ADLS  Prior functional level: Independent in ADLs/IADLs  Current functional level: Independent in ADLs/IADLs    PT AM-PAC:   /24  OT AM-PAC:   /24    Family can provide assistance at DC: Yes  Would you like Case Management to discuss the discharge plan with any other family members/significant others, and if so, who?  No  Plans to Return to Present Housing: Yes  Other Identified Issues/Barriers to RETURNING to current housing: none   Potential Assistance needed at discharge: [facility]            Potential DME: Walker  Patient expects to discharge to:

## 2023-07-23 VITALS
TEMPERATURE: 98.4 F | WEIGHT: 182 LBS | RESPIRATION RATE: 18 BRPM | SYSTOLIC BLOOD PRESSURE: 90 MMHG | DIASTOLIC BLOOD PRESSURE: 54 MMHG | BODY MASS INDEX: 27.58 KG/M2 | OXYGEN SATURATION: 100 % | HEIGHT: 68 IN | HEART RATE: 64 BPM

## 2023-07-23 LAB
ANTI-XA UNFRAC HEPARIN: 0.41 IU/L (ref 0.3–0.7)
ANTI-XA UNFRAC HEPARIN: 0.47 IU/L (ref 0.3–0.7)
ERYTHROCYTE [DISTWIDTH] IN BLOOD BY AUTOMATED COUNT: 25 % (ref 11.8–14.4)
HCT VFR BLD AUTO: 29.2 % (ref 36.3–47.1)
HGB BLD-MCNC: 8.2 G/DL (ref 11.9–15.1)
MCH RBC QN AUTO: 21.9 PG (ref 25.2–33.5)
MCHC RBC AUTO-ENTMCNC: 28.1 G/DL (ref 28.4–34.8)
MCV RBC AUTO: 77.9 FL (ref 82.6–102.9)
NRBC BLD-RTO: 0 PER 100 WBC
PLATELET # BLD AUTO: 232 K/UL (ref 138–453)
PMV BLD AUTO: 8.9 FL (ref 8.1–13.5)
RBC # BLD AUTO: 3.75 M/UL (ref 3.95–5.11)
WBC OTHER # BLD: 4.1 K/UL (ref 3.5–11.3)

## 2023-07-23 PROCEDURE — 85520 HEPARIN ASSAY: CPT

## 2023-07-23 PROCEDURE — 6370000000 HC RX 637 (ALT 250 FOR IP): Performed by: NURSE PRACTITIONER

## 2023-07-23 PROCEDURE — 85027 COMPLETE CBC AUTOMATED: CPT

## 2023-07-23 PROCEDURE — 99239 HOSP IP/OBS DSCHRG MGMT >30: CPT | Performed by: INTERNAL MEDICINE

## 2023-07-23 PROCEDURE — 6370000000 HC RX 637 (ALT 250 FOR IP): Performed by: INTERNAL MEDICINE

## 2023-07-23 PROCEDURE — 36415 COLL VENOUS BLD VENIPUNCTURE: CPT

## 2023-07-23 RX ORDER — NICOTINE 21 MG/24HR
1 PATCH, TRANSDERMAL 24 HOURS TRANSDERMAL DAILY
Qty: 30 PATCH | Refills: 0 | Status: SHIPPED | OUTPATIENT
Start: 2023-07-24

## 2023-07-23 RX ORDER — PNV NO.95/FERROUS FUM/FOLIC AC 28MG-0.8MG
1 TABLET ORAL 3 TIMES DAILY
Qty: 90 TABLET | Refills: 3 | Status: SHIPPED | OUTPATIENT
Start: 2023-07-23

## 2023-07-23 RX ORDER — LEVOTHYROXINE SODIUM 0.03 MG/1
25 TABLET ORAL DAILY
Status: DISCONTINUED | OUTPATIENT
Start: 2023-07-23 | End: 2023-07-23 | Stop reason: HOSPADM

## 2023-07-23 RX ORDER — LEVOTHYROXINE SODIUM 0.03 MG/1
25 TABLET ORAL DAILY
Qty: 30 TABLET | Refills: 3 | Status: SHIPPED | OUTPATIENT
Start: 2023-07-24

## 2023-07-23 RX ADMIN — PANTOPRAZOLE SODIUM 40 MG: 40 TABLET, DELAYED RELEASE ORAL at 05:25

## 2023-07-23 RX ADMIN — LEVOTHYROXINE SODIUM 25 MCG: 25 TABLET ORAL at 10:19

## 2023-07-23 RX ADMIN — APIXABAN 10 MG: 5 TABLET, FILM COATED ORAL at 14:08

## 2023-07-23 RX ADMIN — ALPRAZOLAM 1 MG: 1 TABLET ORAL at 04:13

## 2023-07-23 NOTE — PLAN OF CARE
Problem: Discharge Planning  Goal: Discharge to home or other facility with appropriate resources  7/23/2023 1432 by Camron Prakash RN  Outcome: Completed

## 2023-07-23 NOTE — DISCHARGE INSTRUCTIONS
YOU ARE BEING STARTED ON ELIQUIS FOR YOUR PULMONARY EMBOLISM (BLOOD CLOT IN LUNG). 1. Your initial prescription  has been called into your pharmacy. RA on [address] [facility] in [place], [place] phone number is [phone]. ONLY your eliquis called in     2. You need to take TWO 5mg tablets TWICE DAILY for 7 days and then ONE 5mg tablet TWICE DAILY thereafter.

## 2023-07-23 NOTE — PLAN OF CARE
Pt had an unwitnessed fall reported to staff. Pt reports she does not remember much of incident, was found by writer in bathroom on toilet asleep with IV out. Writer ambulated pt back bed and completed bed change and new IV placed. Pt reported she does have a hx of sleep walking as reported per pt and this is not the first time she has fallen. Provider was notified and assessed pt, no concerns and will continue to monitor. Bed alarm is on, fall signs and non skid socks are now in use.     Problem: Discharge Planning  Goal: Discharge to home or other facility with appropriate resources  Outcome: Progressing  Discharge to home or other facility with appropriate resources:   Identify barriers to discharge with patient and caregiver   Arrange for needed discharge resources and transportation as appropriate   Identify discharge learning needs (meds, wound care, etc)      Problem: Safety - Adult  Goal: Free from fall injury  Outcome: Progressing  Free From Fall Injury:   Instruct family/caregiver on patient safety   Based on caregiver fall risk screen, instruct family/caregiver to ask for assistance with transferring infant if caregiver noted to have fall risk factors

## 2023-07-23 NOTE — PROGRESS NOTES
[facility]  Office: [address], DO, Yudi Robert, DO, Poncho Donovan, DO, Sven Medina Blood, DO, Glenn Andersen MD, Enriqueta Ramos MD, Geena Ceja MD, Joshua Ramos MD,  Corbin Victor MD, Karen Haywood MD, Kathleen Hanna, DO, Bob Castro MD,  Marie Gabriel, DO, Bhavani De La Cruz MD, Kevin Eng MD, Dejon Juarez, DO, Jessica Puckett MD,  Jermain Sherwood, DO, Pavel Chavez MD, Steven Zabala MD, Sofy Solis MD, Ines Boles MD,  Vidya Jones MD, Bharat Hartley MD, Venu Christianson, DO, Sada Parham MD,  Madison Shaffer MD, Blu Adan, CNP,  Alicia Taylor, CNP, Mayela Coleman, CNP, Randy Burks, CNP,  Laurie Carranza, JODY, Prabhakar Saravia, CNP, Lottie Wisdom, CNP, Montrell Del Rio, CNP, Luanne Cooper, CNP, Ambika Delacruz, CNP, Tamica Epperson PA-C, Aiden Moore, [facility], Marquis Ge, CNP, Brian Villalpando, [facility]    Progress Note    7/23/2023    8:01 AM    Name:   Paige Kwong  MRN:     [id number]     Acct:      [de-identified]   Room:   [address] [place] [address] Day:  2  Admit Date:  7/21/2023  1:37 PM    PCP:   KETTY Casillas NP  Code Status:  Full Code    Subjective:     C/C:   Chief Complaint   Patient presents with    Leg Swelling     Bilat, states traveling up legs     Interval History Status:  Currently on heparin drip for PE, venous Doppler result for lower extremities is negative. Denies tightness in chest   Leg swelling is slightly better  Denies fever or chills today  Feels very anxious, takes Xanax at home as needed  Has chronic iron deficiency anemia due to gastric bypass, vitamin levels came normal  TSH 10.14, free T4  0.9    Brief History:   Patient presents to the emergency room today with complaints of bilateral leg edema. Patient states that her bilateral leg edema started approximately 2 weeks ago. She was experiencing intermittent weeping edema as well.   Patient had attempted

## 2023-07-23 NOTE — PROGRESS NOTES
Pt discharged to home. Belonging gathered and taken with pt, IV removed, discharge instruction given, pt verbalizes understanding. All questions and concerns addressed. Safety maintained.

## 2023-07-23 NOTE — DISCHARGE SUMMARY
medication(s) that are the same as other medications prescribed for you. Read the directions carefully, and ask your doctor or other care provider to review them with you. CHANGE how you take these medications      Iron 325 (65 Fe) MG Tabs  Take 1 tablet by mouth 3 times daily  What changed: when to take this            CONTINUE taking these medications      albuterol sulfate  (90 Base) MCG/ACT inhaler  Commonly known as: PROVENTIL;VENTOLIN;PROAIR     ALPRAZolam 1 MG tablet  Commonly known as: XANAX     amphetamine-dextroamphetamine 30 MG tablet  Commonly known as: ADDERALL     buprenorphine-naloxone 8-2 MG Film SL film  Commonly known as: SUBOXONE     hydrOXYzine HCl 50 MG tablet  Commonly known as: ATARAX     omeprazole 20 MG delayed release capsule  Commonly known as: PRILOSEC            STOP taking these medications      propranolol 10 MG tablet  Commonly known as: INDERAL               Where to Get Your Medications        These medications were sent to [address] [place] [address], [address] [place] [address]      Phone: [phone]   apixaban 5 MG Tabs tablet  apixaban 5 MG Tabs tablet  Iron 325 (65 Fe) MG Tabs  levothyroxine 25 MCG tablet  nicotine 21 MG/24HR         No discharge procedures on file. Time Spent on discharge is  35 mins in patient examination, evaluation, counseling as well as medication reconciliation, prescriptions for required medications, discharge plan and follow up. Electronically signed by   Farzad Oscar MD  7/23/2023  4:38 PM      Thank you Dr. Sarita Romero, APRN - NP for the opportunity to be involved in this patient's care.

## 2023-07-23 NOTE — PROGRESS NOTES
Pt's has a bottle of \"pepcid\" that were placed in med lockup by night nurse. Bottle has 2 kinds of meds in it. Night nurse also put pt's cigarettes and lighter in lockup. Pt requesting pill bottle and cigarettes at this time. Explained to pt that she has a nicotine patch and should not smoke with this patch on. She states that other nurses allowed her to smoke. Advised pt no other nurses actually knew she had cigarettes or was smoking. Explained the patch could be taken off but she still could not have the bottle of pills until discharge because they could not  be kept in the room. Pt states she would just leave now and rips nicotine patch off. Advised pt she is still due for IV iron and she states she will not stay for it. IV removed and discharge paperwork completed.

## 2023-07-23 NOTE — CARE COORDINATION
Discharge planning    Discussed with attending. Wishes to send home on ac. Will call in to pharmacy for eliquis to see if needs any PA> did request order for a walker to attending per patient request.     8075  Sent rx and documentation to [facility] for walker. Added to dic instructions. 1110  Call to RA at [phone]. Called in eliquis starter pack to pharmacist and she asked for a half hour to run to see if PA needed. Will call back at noon     1212   Call back to RA and it is covered but not in stock. She did check with local RA in the area and only one is [facility]. Patient is ok with driving to that location. Call to them at [phone] to see if can fill script . Spoke with pharmacy and they do have one. Called in starter pack for patient to RA on [facility].

## 2023-07-23 NOTE — DISCHARGE INSTR - COC
applicable)   Name:  Address:  Dialysis Schedule:  Phone:  Fax:    / signature: {Esignature:[id number]}    PHYSICIAN SECTION    Prognosis: Fair    Condition at Discharge: Stable    Rehab Potential (if transferring to Rehab): Fair    Recommended Labs or Other Treatments After Discharge: Take Eliquis as directed at least for 6 months, repeat TSH in 3 months to adjust levothyroxine dose    Physician Certification: I certify the above information and transfer of Jose Bradshaw  is necessary for the continuing treatment of the diagnosis listed and that she requires Home Care for greater 30 days.      Update Admission H&P: No change in H&P    PHYSICIAN SIGNATURE:  Electronically signed by Aurelio Ramos MD on 7/23/23 at 12:57 PM EDT

## 2023-07-27 ENCOUNTER — APPOINTMENT (OUTPATIENT)
Dept: GENERAL RADIOLOGY | Age: 43
End: 2023-07-27
Payer: MEDICAID

## 2023-07-27 ENCOUNTER — HOSPITAL ENCOUNTER (EMERGENCY)
Age: 43
Discharge: HOME | End: 2023-07-27
Attending: EMERGENCY MEDICINE
Payer: MEDICAID

## 2023-07-27 VITALS
WEIGHT: 186 LBS | TEMPERATURE: 98.4 F | BODY MASS INDEX: 29.19 KG/M2 | SYSTOLIC BLOOD PRESSURE: 114 MMHG | HEART RATE: 78 BPM | RESPIRATION RATE: 20 BRPM | HEIGHT: 67 IN | OXYGEN SATURATION: 98 % | DIASTOLIC BLOOD PRESSURE: 73 MMHG

## 2023-07-27 DIAGNOSIS — M79.89 LEG SWELLING: Primary | ICD-10-CM

## 2023-07-27 LAB
ALBUMIN SERPL-MCNC: 2.2 G/DL (ref 3.5–5.2)
ALP SERPL-CCNC: 114 U/L (ref 35–104)
ALT SERPL-CCNC: 18 U/L (ref 5–33)
ANION GAP SERPL CALCULATED.3IONS-SCNC: 9 MMOL/L (ref 9–17)
AST SERPL-CCNC: 21 U/L
BASOPHILS # BLD: 0.06 K/UL (ref 0–0.2)
BASOPHILS NFR BLD: 1 % (ref 0–2)
BILIRUB SERPL-MCNC: 0.3 MG/DL (ref 0.3–1.2)
BNP SERPL-MCNC: 679 PG/ML
BUN SERPL-MCNC: 27 MG/DL (ref 6–20)
BUN/CREAT SERPL: 27 (ref 9–20)
CALCIUM SERPL-MCNC: 7.4 MG/DL (ref 8.6–10.4)
CHLORIDE SERPL-SCNC: 100 MMOL/L (ref 98–107)
CO2 SERPL-SCNC: 22 MMOL/L (ref 20–31)
CREAT SERPL-MCNC: 1 MG/DL (ref 0.5–0.9)
EOSINOPHIL # BLD: 0.06 K/UL (ref 0–0.44)
EOSINOPHILS RELATIVE PERCENT: 1 % (ref 1–4)
ERYTHROCYTE [DISTWIDTH] IN BLOOD BY AUTOMATED COUNT: 24.9 % (ref 11.8–14.4)
GFR SERPL CREATININE-BSD FRML MDRD: >60 ML/MIN/1.73M2
GLUCOSE SERPL-MCNC: 79 MG/DL (ref 70–99)
HCT VFR BLD AUTO: 29 % (ref 36.3–47.1)
HGB BLD-MCNC: 8.4 G/DL (ref 11.9–15.1)
IMM GRANULOCYTES # BLD AUTO: 0 K/UL (ref 0–0.3)
IMM GRANULOCYTES NFR BLD: 0 %
LYMPHOCYTES NFR BLD: 1.54 K/UL (ref 1.1–3.7)
LYMPHOCYTES RELATIVE PERCENT: 28 % (ref 24–43)
MCH RBC QN AUTO: 21.7 PG (ref 25.2–33.5)
MCHC RBC AUTO-ENTMCNC: 29 G/DL (ref 28.4–34.8)
MCV RBC AUTO: 74.9 FL (ref 82.6–102.9)
MONOCYTES NFR BLD: 0.55 K/UL (ref 0.1–1.2)
MONOCYTES NFR BLD: 10 % (ref 3–12)
MORPHOLOGY: ABNORMAL
NEUTROPHILS NFR BLD: 60 % (ref 36–65)
NEUTS SEG NFR BLD: 3.29 K/UL (ref 1.5–8.1)
NRBC BLD-RTO: 0 PER 100 WBC
PLATELET # BLD AUTO: 319 K/UL (ref 138–453)
PMV BLD AUTO: 8.7 FL (ref 8.1–13.5)
POTASSIUM SERPL-SCNC: 3.6 MMOL/L (ref 3.7–5.3)
PROT SERPL-MCNC: 4.2 G/DL (ref 6.4–8.3)
RBC # BLD AUTO: 3.87 M/UL (ref 3.95–5.11)
SODIUM SERPL-SCNC: 131 MMOL/L (ref 135–144)
TROPONIN I SERPL HS-MCNC: 13 NG/L (ref 0–14)
WBC OTHER # BLD: 5.5 K/UL (ref 3.5–11.3)

## 2023-07-27 PROCEDURE — 85027 COMPLETE CBC AUTOMATED: CPT

## 2023-07-27 PROCEDURE — 6370000000 HC RX 637 (ALT 250 FOR IP): Performed by: NURSE PRACTITIONER

## 2023-07-27 PROCEDURE — 83880 ASSAY OF NATRIURETIC PEPTIDE: CPT

## 2023-07-27 PROCEDURE — 99285 EMERGENCY DEPT VISIT HI MDM: CPT

## 2023-07-27 PROCEDURE — 84484 ASSAY OF TROPONIN QUANT: CPT

## 2023-07-27 PROCEDURE — 71045 X-RAY EXAM CHEST 1 VIEW: CPT

## 2023-07-27 PROCEDURE — 6360000002 HC RX W HCPCS: Performed by: NURSE PRACTITIONER

## 2023-07-27 PROCEDURE — 93005 ELECTROCARDIOGRAM TRACING: CPT | Performed by: NURSE PRACTITIONER

## 2023-07-27 PROCEDURE — 80053 COMPREHEN METABOLIC PANEL: CPT

## 2023-07-27 PROCEDURE — 96374 THER/PROPH/DIAG INJ IV PUSH: CPT

## 2023-07-27 RX ORDER — OMEPRAZOLE 20 MG/1
20 CAPSULE, DELAYED RELEASE ORAL ONCE
Status: DISCONTINUED | OUTPATIENT
Start: 2023-07-27 | End: 2023-07-27 | Stop reason: CLARIF

## 2023-07-27 RX ORDER — POTASSIUM CHLORIDE 20 MEQ/1
20 TABLET, EXTENDED RELEASE ORAL DAILY
Qty: 3 TABLET | Refills: 0 | Status: SHIPPED | OUTPATIENT
Start: 2023-07-27 | End: 2023-07-30

## 2023-07-27 RX ORDER — FUROSEMIDE 40 MG/1
40 TABLET ORAL DAILY
Qty: 3 TABLET | Refills: 0 | Status: SHIPPED | OUTPATIENT
Start: 2023-07-27 | End: 2023-07-30

## 2023-07-27 RX ORDER — FUROSEMIDE 10 MG/ML
40 INJECTION INTRAMUSCULAR; INTRAVENOUS ONCE
Status: COMPLETED | OUTPATIENT
Start: 2023-07-27 | End: 2023-07-27

## 2023-07-27 RX ORDER — POTASSIUM CHLORIDE 20 MEQ/1
40 TABLET, EXTENDED RELEASE ORAL ONCE
Status: COMPLETED | OUTPATIENT
Start: 2023-07-27 | End: 2023-07-27

## 2023-07-27 RX ORDER — PANTOPRAZOLE SODIUM 40 MG/1
40 TABLET, DELAYED RELEASE ORAL ONCE
Status: COMPLETED | OUTPATIENT
Start: 2023-07-27 | End: 2023-07-27

## 2023-07-27 RX ADMIN — POTASSIUM CHLORIDE 40 MEQ: 1500 TABLET, EXTENDED RELEASE ORAL at 22:38

## 2023-07-27 RX ADMIN — FUROSEMIDE 40 MG: 10 INJECTION, SOLUTION INTRAMUSCULAR; INTRAVENOUS at 22:49

## 2023-07-27 RX ADMIN — PANTOPRAZOLE SODIUM 40 MG: 40 TABLET, DELAYED RELEASE ORAL at 23:31

## 2023-07-27 ASSESSMENT — ENCOUNTER SYMPTOMS
NAUSEA: 0
CHEST TIGHTNESS: 1
ABDOMINAL PAIN: 0
WHEEZING: 0
COLOR CHANGE: 1
VOMITING: 0
SHORTNESS OF BREATH: 1

## 2023-07-28 LAB
EKG ATRIAL RATE: 75 BPM
EKG P AXIS: 42 DEGREES
EKG P-R INTERVAL: 164 MS
EKG Q-T INTERVAL: 412 MS
EKG QRS DURATION: 84 MS
EKG QTC CALCULATION (BAZETT): 460 MS
EKG R AXIS: -12 DEGREES
EKG T AXIS: 2 DEGREES
EKG VENTRICULAR RATE: 75 BPM

## 2023-07-28 PROCEDURE — 93010 ELECTROCARDIOGRAM REPORT: CPT | Performed by: INTERNAL MEDICINE

## 2023-07-28 NOTE — DISCHARGE INSTRUCTIONS
Take medication as prescribed. Follow-up with your primary care provider. Return to emergency department if symptoms worsen.
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
## 2023-07-28 NOTE — ED NOTES
Patient presents to ER from home due to bilateral leg swelling. Patient states leg swelling started 2 weeks ago. Patient states swelling has worsen since then. Patient states she has limited movement and her legs are weeping fluid. Patient A/O x 4, Equal respiration and non labored breathing. Wheels locked, bed in lowest position, maral light in reach.      Liliana Grant RN  07/27/23 7353

## 2023-07-28 NOTE — ED PROVIDER NOTES
eMERGENCY dEPARTMENT eNCOUnter   Independent Attestation     Pt Name: Janey Lagunas  MRN: [id number]  [address] [facility] 1980  Date of evaluation: 7/27/23     Janey Lagunas is a 43 y.o. female with CC: Leg Swelling (Bilateral leg swelling with weeping edema. Was in the hospital for PE, taking medications as prescribed. )        This visit was performed by both a physician and an APC. I performed all aspects of the MDM as documented.       Junior Ayon MD  Attending Emergency Physician            Junior Ayon MD  07/27/23 4982

## 2023-08-09 PROCEDURE — 99285 EMERGENCY DEPT VISIT HI MDM: CPT

## 2023-08-09 PROCEDURE — 96365 THER/PROPH/DIAG IV INF INIT: CPT

## 2023-08-10 ENCOUNTER — APPOINTMENT (OUTPATIENT)
Dept: GENERAL RADIOLOGY | Age: 43
DRG: 421 | End: 2023-08-10
Payer: MEDICAID

## 2023-08-10 ENCOUNTER — HOSPITAL ENCOUNTER (INPATIENT)
Age: 43
LOS: 6 days | Discharge: LEFT BEFORE DISCHARGE | DRG: 421 | End: 2023-08-16
Attending: STUDENT IN AN ORGANIZED HEALTH CARE EDUCATION/TRAINING PROGRAM | Admitting: INTERNAL MEDICINE
Payer: MEDICAID

## 2023-08-10 ENCOUNTER — APPOINTMENT (OUTPATIENT)
Dept: CT IMAGING | Age: 43
DRG: 421 | End: 2023-08-10
Payer: MEDICAID

## 2023-08-10 ENCOUNTER — APPOINTMENT (OUTPATIENT)
Age: 43
DRG: 421 | End: 2023-08-10
Payer: MEDICAID

## 2023-08-10 ENCOUNTER — APPOINTMENT (OUTPATIENT)
Dept: INTERVENTIONAL RADIOLOGY/VASCULAR | Age: 43
DRG: 421 | End: 2023-08-10
Payer: MEDICAID

## 2023-08-10 DIAGNOSIS — J90 PLEURAL EFFUSION: ICD-10-CM

## 2023-08-10 DIAGNOSIS — R18.8 OTHER ASCITES: ICD-10-CM

## 2023-08-10 DIAGNOSIS — R06.89 DYSPNEA AND RESPIRATORY ABNORMALITIES: ICD-10-CM

## 2023-08-10 DIAGNOSIS — R06.00 DYSPNEA AND RESPIRATORY ABNORMALITIES: ICD-10-CM

## 2023-08-10 DIAGNOSIS — I31.39 PERICARDIAL EFFUSION: Primary | ICD-10-CM

## 2023-08-10 PROBLEM — F19.91 HISTORY OF INTRAVENOUS DRUG USE IN REMISSION: Status: ACTIVE | Noted: 2023-08-10

## 2023-08-10 PROBLEM — E46 PROTEIN DEFICIENCY (HCC): Status: ACTIVE | Noted: 2023-08-10

## 2023-08-10 PROBLEM — R60.1 ANASARCA: Status: ACTIVE | Noted: 2023-08-10

## 2023-08-10 PROBLEM — Z91.89 AT RISK FOR FLUID VOLUME OVERLOAD: Status: ACTIVE | Noted: 2023-08-10

## 2023-08-10 PROBLEM — E43 SEVERE PROTEIN-CALORIE MALNUTRITION (HCC): Status: ACTIVE | Noted: 2023-08-10

## 2023-08-10 PROBLEM — N30.00 ACUTE CYSTITIS WITHOUT HEMATURIA: Status: ACTIVE | Noted: 2023-08-10

## 2023-08-10 LAB
ALBUMIN SERPL-MCNC: 1.9 G/DL (ref 3.5–5.2)
ALBUMIN SERPL-MCNC: 2 G/DL (ref 3.5–5.2)
ALP SERPL-CCNC: 165 U/L (ref 35–104)
ALT SERPL-CCNC: 28 U/L (ref 5–33)
ANION GAP SERPL CALCULATED.3IONS-SCNC: 9 MMOL/L (ref 9–17)
AST SERPL-CCNC: 18 U/L
BACTERIA URNS QL MICRO: ABNORMAL
BASOPHILS # BLD: 0 K/UL (ref 0–0.2)
BASOPHILS NFR BLD: 0 % (ref 0–2)
BILIRUB SERPL-MCNC: 0.4 MG/DL (ref 0.3–1.2)
BILIRUB UR QL STRIP: ABNORMAL
BNP SERPL-MCNC: 474 PG/ML
BUN SERPL-MCNC: 29 MG/DL (ref 6–20)
BUN/CREAT SERPL: 32 (ref 9–20)
CALCIUM SERPL-MCNC: 7.6 MG/DL (ref 8.6–10.4)
CHLORIDE SERPL-SCNC: 100 MMOL/L (ref 98–107)
CLARITY UR: CLEAR
CO2 SERPL-SCNC: 22 MMOL/L (ref 20–31)
COLOR UR: YELLOW
CREAT SERPL-MCNC: 0.9 MG/DL (ref 0.5–0.9)
CRYSTALS URNS MICRO: ABNORMAL /HPF
EOSINOPHIL # BLD: 0 K/UL (ref 0–0.44)
EOSINOPHILS RELATIVE PERCENT: 0 % (ref 1–4)
EPI CELLS #/AREA URNS HPF: ABNORMAL /HPF (ref 0–5)
ERYTHROCYTE [DISTWIDTH] IN BLOOD BY AUTOMATED COUNT: 25.2 % (ref 11.8–14.4)
GFR SERPL CREATININE-BSD FRML MDRD: >60 ML/MIN/1.73M2
GLUCOSE SERPL-MCNC: 111 MG/DL (ref 70–99)
GLUCOSE UR STRIP-MCNC: NEGATIVE MG/DL
HAV IGM SERPL QL IA: NONREACTIVE
HBV CORE IGM SERPL QL IA: NONREACTIVE
HBV SURFACE AG SERPL QL IA: NONREACTIVE
HCG SERPL QL: NEGATIVE
HCT VFR BLD AUTO: 40.7 % (ref 36.3–47.1)
HCV AB SERPL QL IA: NONREACTIVE
HGB BLD-MCNC: 11.8 G/DL (ref 11.9–15.1)
HGB UR QL STRIP.AUTO: NEGATIVE
HIV 1+2 AB+HIV1 P24 AG SERPL QL IA: NONREACTIVE
IMM GRANULOCYTES # BLD AUTO: 0.19 K/UL (ref 0–0.3)
IMM GRANULOCYTES NFR BLD: 1 %
KETONES UR STRIP-MCNC: ABNORMAL MG/DL
LACTATE BLDV-SCNC: 0.8 MMOL/L (ref 0.5–2.2)
LEUKOCYTE ESTERASE UR QL STRIP: ABNORMAL
LIPASE SERPL-CCNC: 9 U/L (ref 13–60)
LYMPHOCYTES NFR BLD: 1.67 K/UL (ref 1.1–3.7)
LYMPHOCYTES RELATIVE PERCENT: 9 % (ref 24–43)
MCH RBC QN AUTO: 23.3 PG (ref 25.2–33.5)
MCHC RBC AUTO-ENTMCNC: 29 G/DL (ref 28.4–34.8)
MCV RBC AUTO: 80.4 FL (ref 82.6–102.9)
MONOCYTES NFR BLD: 1.11 K/UL (ref 0.1–1.2)
MONOCYTES NFR BLD: 6 % (ref 3–12)
MORPHOLOGY: ABNORMAL
NEUTROPHILS NFR BLD: 84 % (ref 36–65)
NEUTS SEG NFR BLD: 15.53 K/UL (ref 1.5–8.1)
NITRITE UR QL STRIP: POSITIVE
NRBC BLD-RTO: 0 PER 100 WBC
PH UR STRIP: 6 [PH] (ref 5–8)
PLATELET # BLD AUTO: 421 K/UL (ref 138–453)
PMV BLD AUTO: 8.3 FL (ref 8.1–13.5)
POTASSIUM SERPL-SCNC: 4.5 MMOL/L (ref 3.7–5.3)
PROCALCITONIN SERPL-MCNC: 0.11 NG/ML
PROT SERPL-MCNC: 3.8 G/DL (ref 6.4–8.3)
PROT UR STRIP-MCNC: ABNORMAL MG/DL
RBC # BLD AUTO: 5.06 M/UL (ref 3.95–5.11)
RBC #/AREA URNS HPF: ABNORMAL /HPF (ref 0–2)
SODIUM SERPL-SCNC: 131 MMOL/L (ref 135–144)
SP GR UR STRIP: 1.02 (ref 1–1.03)
TROPONIN I SERPL HS-MCNC: 16 NG/L (ref 0–14)
TROPONIN I SERPL HS-MCNC: 17 NG/L (ref 0–14)
UROBILINOGEN UR STRIP-ACNC: NORMAL EU/DL (ref 0–1)
WBC #/AREA URNS HPF: ABNORMAL /HPF (ref 0–5)
WBC OTHER # BLD: 18.5 K/UL (ref 3.5–11.3)

## 2023-08-10 PROCEDURE — 87389 HIV-1 AG W/HIV-1&-2 AB AG IA: CPT

## 2023-08-10 PROCEDURE — 6360000002 HC RX W HCPCS: Performed by: STUDENT IN AN ORGANIZED HEALTH CARE EDUCATION/TRAINING PROGRAM

## 2023-08-10 PROCEDURE — 71045 X-RAY EXAM CHEST 1 VIEW: CPT

## 2023-08-10 PROCEDURE — 2060000000 HC ICU INTERMEDIATE R&B

## 2023-08-10 PROCEDURE — 87040 BLOOD CULTURE FOR BACTERIA: CPT

## 2023-08-10 PROCEDURE — 80053 COMPREHEN METABOLIC PANEL: CPT

## 2023-08-10 PROCEDURE — 2580000003 HC RX 258: Performed by: NURSE PRACTITIONER

## 2023-08-10 PROCEDURE — 74176 CT ABD & PELVIS W/O CONTRAST: CPT

## 2023-08-10 PROCEDURE — 93306 TTE W/DOPPLER COMPLETE: CPT

## 2023-08-10 PROCEDURE — 82040 ASSAY OF SERUM ALBUMIN: CPT

## 2023-08-10 PROCEDURE — 85025 COMPLETE CBC W/AUTO DIFF WBC: CPT

## 2023-08-10 PROCEDURE — 36415 COLL VENOUS BLD VENIPUNCTURE: CPT

## 2023-08-10 PROCEDURE — 6360000004 HC RX CONTRAST MEDICATION: Performed by: STUDENT IN AN ORGANIZED HEALTH CARE EDUCATION/TRAINING PROGRAM

## 2023-08-10 PROCEDURE — 83605 ASSAY OF LACTIC ACID: CPT

## 2023-08-10 PROCEDURE — 2580000003 HC RX 258: Performed by: STUDENT IN AN ORGANIZED HEALTH CARE EDUCATION/TRAINING PROGRAM

## 2023-08-10 PROCEDURE — 71260 CT THORAX DX C+: CPT

## 2023-08-10 PROCEDURE — 84484 ASSAY OF TROPONIN QUANT: CPT

## 2023-08-10 PROCEDURE — 84703 CHORIONIC GONADOTROPIN ASSAY: CPT

## 2023-08-10 PROCEDURE — 6370000000 HC RX 637 (ALT 250 FOR IP): Performed by: NURSE PRACTITIONER

## 2023-08-10 PROCEDURE — 80074 ACUTE HEPATITIS PANEL: CPT

## 2023-08-10 PROCEDURE — 6360000002 HC RX W HCPCS: Performed by: NURSE PRACTITIONER

## 2023-08-10 PROCEDURE — 51798 US URINE CAPACITY MEASURE: CPT

## 2023-08-10 PROCEDURE — 81001 URINALYSIS AUTO W/SCOPE: CPT

## 2023-08-10 PROCEDURE — 99223 1ST HOSP IP/OBS HIGH 75: CPT | Performed by: NURSE PRACTITIONER

## 2023-08-10 PROCEDURE — 83880 ASSAY OF NATRIURETIC PEPTIDE: CPT

## 2023-08-10 PROCEDURE — 84145 PROCALCITONIN (PCT): CPT

## 2023-08-10 PROCEDURE — 83690 ASSAY OF LIPASE: CPT

## 2023-08-10 RX ORDER — CEPHALEXIN 500 MG/1
500 CAPSULE ORAL ONCE
Status: DISCONTINUED | OUTPATIENT
Start: 2023-08-10 | End: 2023-08-10

## 2023-08-10 RX ORDER — SODIUM CHLORIDE 0.9 % (FLUSH) 0.9 %
10 SYRINGE (ML) INJECTION PRN
Status: DISCONTINUED | OUTPATIENT
Start: 2023-08-10 | End: 2023-08-11 | Stop reason: SDUPTHER

## 2023-08-10 RX ORDER — SODIUM CHLORIDE 9 MG/ML
INJECTION, SOLUTION INTRAVENOUS PRN
Status: DISCONTINUED | OUTPATIENT
Start: 2023-08-10 | End: 2023-08-11 | Stop reason: SDUPTHER

## 2023-08-10 RX ORDER — SODIUM CHLORIDE 9 MG/ML
INJECTION, SOLUTION INTRAVENOUS PRN
Status: DISCONTINUED | OUTPATIENT
Start: 2023-08-10 | End: 2023-08-16 | Stop reason: HOSPADM

## 2023-08-10 RX ORDER — ACETAMINOPHEN 325 MG/1
650 TABLET ORAL EVERY 6 HOURS PRN
Status: DISCONTINUED | OUTPATIENT
Start: 2023-08-10 | End: 2023-08-16 | Stop reason: HOSPADM

## 2023-08-10 RX ORDER — POLYETHYLENE GLYCOL 3350 17 G/17G
17 POWDER, FOR SOLUTION ORAL DAILY PRN
Status: DISCONTINUED | OUTPATIENT
Start: 2023-08-10 | End: 2023-08-16 | Stop reason: HOSPADM

## 2023-08-10 RX ORDER — ONDANSETRON 4 MG/1
4 TABLET, ORALLY DISINTEGRATING ORAL EVERY 8 HOURS PRN
Status: DISCONTINUED | OUTPATIENT
Start: 2023-08-10 | End: 2023-08-16 | Stop reason: HOSPADM

## 2023-08-10 RX ORDER — POTASSIUM CHLORIDE 20 MEQ/1
40 TABLET, EXTENDED RELEASE ORAL PRN
Status: DISCONTINUED | OUTPATIENT
Start: 2023-08-10 | End: 2023-08-16 | Stop reason: HOSPADM

## 2023-08-10 RX ORDER — FUROSEMIDE 20 MG/1
20 TABLET ORAL DAILY
Qty: 15 TABLET | Refills: 0 | Status: SHIPPED | OUTPATIENT
Start: 2023-08-10 | End: 2023-08-10

## 2023-08-10 RX ORDER — SODIUM CHLORIDE 0.9 % (FLUSH) 0.9 %
5-40 SYRINGE (ML) INJECTION EVERY 12 HOURS SCHEDULED
Status: DISCONTINUED | OUTPATIENT
Start: 2023-08-10 | End: 2023-08-11 | Stop reason: SDUPTHER

## 2023-08-10 RX ORDER — BUPRENORPHINE AND NALOXONE 8; 2 MG/1; MG/1
2 FILM, SOLUBLE BUCCAL; SUBLINGUAL DAILY
Status: DISCONTINUED | OUTPATIENT
Start: 2023-08-10 | End: 2023-08-16 | Stop reason: HOSPADM

## 2023-08-10 RX ORDER — ONDANSETRON 2 MG/ML
4 INJECTION INTRAMUSCULAR; INTRAVENOUS EVERY 6 HOURS PRN
Status: DISCONTINUED | OUTPATIENT
Start: 2023-08-10 | End: 2023-08-16 | Stop reason: HOSPADM

## 2023-08-10 RX ORDER — ALBUTEROL SULFATE 90 UG/1
2 AEROSOL, METERED RESPIRATORY (INHALATION) EVERY 6 HOURS PRN
Status: DISCONTINUED | OUTPATIENT
Start: 2023-08-10 | End: 2023-08-16 | Stop reason: HOSPADM

## 2023-08-10 RX ORDER — ACETAMINOPHEN 650 MG/1
650 SUPPOSITORY RECTAL EVERY 6 HOURS PRN
Status: DISCONTINUED | OUTPATIENT
Start: 2023-08-10 | End: 2023-08-16 | Stop reason: HOSPADM

## 2023-08-10 RX ORDER — FUROSEMIDE 10 MG/ML
40 INJECTION INTRAMUSCULAR; INTRAVENOUS 2 TIMES DAILY
Status: DISCONTINUED | OUTPATIENT
Start: 2023-08-10 | End: 2023-08-16

## 2023-08-10 RX ORDER — HYDROXYZINE HYDROCHLORIDE 25 MG/1
100 TABLET, FILM COATED ORAL NIGHTLY
Status: DISCONTINUED | OUTPATIENT
Start: 2023-08-10 | End: 2023-08-16 | Stop reason: HOSPADM

## 2023-08-10 RX ORDER — PANTOPRAZOLE SODIUM 40 MG/1
40 TABLET, DELAYED RELEASE ORAL
Status: DISCONTINUED | OUTPATIENT
Start: 2023-08-11 | End: 2023-08-16 | Stop reason: HOSPADM

## 2023-08-10 RX ORDER — LEVOTHYROXINE SODIUM 0.03 MG/1
25 TABLET ORAL DAILY
Status: DISCONTINUED | OUTPATIENT
Start: 2023-08-10 | End: 2023-08-16 | Stop reason: HOSPADM

## 2023-08-10 RX ORDER — ALPRAZOLAM 1 MG/1
1 TABLET ORAL 4 TIMES DAILY PRN
Status: DISCONTINUED | OUTPATIENT
Start: 2023-08-10 | End: 2023-08-16 | Stop reason: HOSPADM

## 2023-08-10 RX ORDER — POTASSIUM CHLORIDE 7.45 MG/ML
10 INJECTION INTRAVENOUS PRN
Status: DISCONTINUED | OUTPATIENT
Start: 2023-08-10 | End: 2023-08-16 | Stop reason: HOSPADM

## 2023-08-10 RX ORDER — SODIUM CHLORIDE 0.9 % (FLUSH) 0.9 %
5-40 SYRINGE (ML) INJECTION PRN
Status: DISCONTINUED | OUTPATIENT
Start: 2023-08-10 | End: 2023-08-16 | Stop reason: HOSPADM

## 2023-08-10 RX ORDER — BUPRENORPHINE AND NALOXONE 8; 2 MG/1; MG/1
2 FILM, SOLUBLE BUCCAL; SUBLINGUAL DAILY
Status: DISCONTINUED | OUTPATIENT
Start: 2023-08-10 | End: 2023-08-10

## 2023-08-10 RX ORDER — SODIUM CHLORIDE 0.9 % (FLUSH) 0.9 %
5-40 SYRINGE (ML) INJECTION EVERY 12 HOURS SCHEDULED
Status: DISCONTINUED | OUTPATIENT
Start: 2023-08-10 | End: 2023-08-16 | Stop reason: HOSPADM

## 2023-08-10 RX ORDER — ALPRAZOLAM 1 MG/1
1 TABLET ORAL AS NEEDED
Status: DISCONTINUED | OUTPATIENT
Start: 2023-08-10 | End: 2023-08-10

## 2023-08-10 RX ORDER — 0.9 % SODIUM CHLORIDE 0.9 %
80 INTRAVENOUS SOLUTION INTRAVENOUS ONCE
Status: COMPLETED | OUTPATIENT
Start: 2023-08-10 | End: 2023-08-10

## 2023-08-10 RX ORDER — MAGNESIUM SULFATE 1 G/100ML
1000 INJECTION INTRAVENOUS PRN
Status: DISCONTINUED | OUTPATIENT
Start: 2023-08-10 | End: 2023-08-16 | Stop reason: HOSPADM

## 2023-08-10 RX ADMIN — ALPRAZOLAM 1 MG: 1 TABLET ORAL at 11:46

## 2023-08-10 RX ADMIN — VANCOMYCIN HYDROCHLORIDE 1000 MG: 1 INJECTION, POWDER, LYOPHILIZED, FOR SOLUTION INTRAVENOUS at 23:50

## 2023-08-10 RX ADMIN — SODIUM CHLORIDE, PRESERVATIVE FREE 10 ML: 5 INJECTION INTRAVENOUS at 06:29

## 2023-08-10 RX ADMIN — BUPRENORPHINE AND NALOXONE 2 FILM: 8; 2 FILM BUCCAL; SUBLINGUAL at 15:38

## 2023-08-10 RX ADMIN — APIXABAN 5 MG: 5 TABLET, FILM COATED ORAL at 21:40

## 2023-08-10 RX ADMIN — HYDROXYZINE HYDROCHLORIDE 100 MG: 25 TABLET, FILM COATED ORAL at 21:40

## 2023-08-10 RX ADMIN — PIPERACILLIN AND TAZOBACTAM 3375 MG: 3; .375 INJECTION, POWDER, LYOPHILIZED, FOR SOLUTION INTRAVENOUS at 19:23

## 2023-08-10 RX ADMIN — IOPAMIDOL 75 ML: 755 INJECTION, SOLUTION INTRAVENOUS at 06:29

## 2023-08-10 RX ADMIN — APIXABAN 5 MG: 5 TABLET, FILM COATED ORAL at 11:46

## 2023-08-10 RX ADMIN — FUROSEMIDE 40 MG: 10 INJECTION, SOLUTION INTRAMUSCULAR; INTRAVENOUS at 19:18

## 2023-08-10 RX ADMIN — PIPERACILLIN AND TAZOBACTAM 4500 MG: 4; .5 INJECTION, POWDER, LYOPHILIZED, FOR SOLUTION INTRAVENOUS at 11:42

## 2023-08-10 RX ADMIN — SODIUM CHLORIDE 80 ML: 9 INJECTION, SOLUTION INTRAVENOUS at 06:29

## 2023-08-10 RX ADMIN — SODIUM CHLORIDE 10 ML/HR: 9 INJECTION, SOLUTION INTRAVENOUS at 11:40

## 2023-08-10 RX ADMIN — SODIUM CHLORIDE, PRESERVATIVE FREE 10 ML: 5 INJECTION INTRAVENOUS at 11:46

## 2023-08-10 RX ADMIN — VANCOMYCIN HYDROCHLORIDE 2000 MG: 5 INJECTION, POWDER, LYOPHILIZED, FOR SOLUTION INTRAVENOUS at 12:35

## 2023-08-10 RX ADMIN — IRON SUCROSE 300 MG: 20 INJECTION, SOLUTION INTRAVENOUS at 15:52

## 2023-08-10 RX ADMIN — SODIUM CHLORIDE: 9 INJECTION, SOLUTION INTRAVENOUS at 11:41

## 2023-08-10 RX ADMIN — CEFTRIAXONE SODIUM 2000 MG: 2 INJECTION, POWDER, FOR SOLUTION INTRAMUSCULAR; INTRAVENOUS at 06:41

## 2023-08-10 RX ADMIN — LEVOTHYROXINE SODIUM 25 MCG: 25 TABLET ORAL at 11:46

## 2023-08-10 ASSESSMENT — ENCOUNTER SYMPTOMS
COUGH: 1
COLOR CHANGE: 0
NAUSEA: 1
VOMITING: 1
SINUS PAIN: 0
SHORTNESS OF BREATH: 1
ABDOMINAL DISTENTION: 1

## 2023-08-10 ASSESSMENT — PAIN SCALES - GENERAL: PAINLEVEL_OUTOF10: 3

## 2023-08-10 NOTE — ED NOTES
Dr. Kasey Curry at the bedside to update pt on CT results. Pt requesting something to eat. Breakfast tray ordered. Pt given ice chips. Denies any pain or SOB at this time.      Brittney Kaplan RN  08/10/23 4997

## 2023-08-10 NOTE — PLAN OF CARE
Pt is currently resting in bed comfortably. Pt is to be NPO at midnight, paracentesis scheduled for tomorrow morning. Pt complaining of all over body chills but temp WNL. Post and pre-void bladder scan needed. Pt given SNF facilities to look at and compare with insurance by . IV antibiotics given. See MAR. Standard safety precautions in place, call light within reach, bed in lowest and locked position. All questions and concerns have been address with pt and family at bedside. Will continue to support and monitor.     Problem: Discharge Planning  Goal: Discharge to home or other facility with appropriate resources  Outcome: Progressing     Problem: Safety - Adult  Goal: Free from fall injury  Outcome: Progressing     Problem: ABCDS Injury Assessment  Goal: Absence of physical injury  Outcome: Progressing     Problem: Pain  Goal: Verbalizes/displays adequate comfort level or baseline comfort level  Outcome: Progressing     Problem: Musculoskeletal - Adult  Goal: Return mobility to safest level of function  Outcome: Progressing     Problem: Musculoskeletal - Adult  Goal: Return ADL status to a safe level of function  Outcome: Progressing

## 2023-08-10 NOTE — PROGRESS NOTES
Pt admitted to room 1008 from ED. Admission documentation complete, vitals taken and telemetry placed. Pt oriented to room and unit routine, including hourly rounding. Call light in reach and safety maintained. Will continue to provide support and education.

## 2023-08-10 NOTE — ED NOTES
Presents with c/o swelling. Pt was seen here two weeks ago and discharged with Lasix. States she ran out of Lasix and feels like the swelling is worse. Also states she has had minimal urine output.       Veronique Mendoza RN  08/10/23 0034

## 2023-08-10 NOTE — CARE COORDINATION
Case Management Assessment  Initial Evaluation    Date/Time of Evaluation: 8/10/2023 3:24 PM  Assessment Completed by: Janet Lazcano RN    If patient is discharged prior to next notation, then this note serves as note for discharge by case management. Patient Name: Radha Hernandez                   YOB: 1980  Diagnosis: Pericardial effusion [I31.39]  Pleural effusion [J90]  Dyspnea and respiratory abnormalities [R06.00, R06.89]  Other ascites [R18.8]                   Date / Time: 8/10/2023  1:01 AM    Patient Admission Status: Inpatient   Readmission Risk (Low < 19, Mod (19-27), High > 27): Readmission Risk Score: 18.4    Current PCP: KETTY Cota NP  PCP verified by ? Yes    Chart Reviewed: Yes      History Provided by: Patient  Patient Orientation: Alert and Oriented    Patient Cognition: Alert    Hospitalization in the last 30 days (Readmission):  Yes    If yes, Readmission Assessment in  Navigator will be completed. Advance Directives:      Code Status: Full Code   Patient's Primary Decision Maker is: Legal Next of Kin      Discharge Planning:    Patient lives with: Spouse/Significant Other Type of Home: House  Primary Care Giver: Self  Patient Support Systems include: Spouse/Significant Other   Current Financial resources: None  Current community resources: None  Current services prior to admission: None            Current DME:              Type of Home Care services:  OT, PT    ADLS  Prior functional level: Independent in ADLs/IADLs  Current functional level: Assistance with the following:, Shopping, Housework, Cooking, Toileting    PT AM-PAC:   /24  OT AM-PAC:   /24    Family can provide assistance at DC: Yes (but spouse is now working)  Would you like Case Management to discuss the discharge plan with any other family members/significant others, and if so, who?  No  Plans to Return to Present Housing: Unknown at present  Other Identified Issues/Barriers to RETURNING to current housing: placement   Potential Assistance needed at discharge: [facility]            Potential DME: Shower Chair, Walker (toilet riser)  Patient expects to discharge to: [address] [facility] [address] for transportation at discharge: Family    Financial    Payor: [employer] / Plan: [employer] / Product Type: *No Product type* /     Does insurance require precert for SNF: Yes    Potential assistance Purchasing Medications: No  Meds-to-Beds request: Yes      [id number] - Kathe Sepulveda, [address]  [place] [address]  Phone: [phone] Fax: [phone]      Notes:    Factors facilitating achievement of predicted outcomes: Pleasant    Barriers to discharge: Limited insight into deficits, Decreased endurance, Lower extremity weakness, and Medication managment    Additional Case Management Notes:   Patient lives with spouse. She was recently here 7/21-7/23 and declined all services at time of discharge. She is back for pericardial effusion and states she is much weaker than last admit. She did not follow up with pcp and when asked why she stated she didn't get along with them and is trying to find a new one. List given for [facility] and offered to assist in setting up an appt. Without pcp home care will not be an option unless she is agreeble to stay in same practice as her last doctor . She stated she was seeing NP at [facility]    Call to [facility] to inquire if they would take her back and they would have to follow up with  and provider. that provider is set to leave so even if they said yes would be a long time before can get in. [place] medicaid PCP list  given. Discussed potential for placement and she is agreeable to see what therapy recommends. Unable to have home care.  Did provide snf list too     Patient had DVT last admission and was given eliquis

## 2023-08-10 NOTE — PLAN OF CARE
Problem: Respiratory - Adult  Goal: Achieves optimal ventilation and oxygenation  Outcome: Progressing  Flowsheets (Taken 8/10/2023 3836)  Achieves optimal ventilation and oxygenation:   Assess for changes in respiratory status   Respiratory therapy support as indicated  Note: BRONCHOSPASM/BRONCHOCONSTRICTION    IMPROVE  AERATION/BREATHSOUNDS  ADMINISTER BRONCHODILATOR THERAPY AS APPROPRIATE  ASSESS BREATH SOUNDS  PATIENT EDUCATION AS NEEDED

## 2023-08-10 NOTE — PROGRESS NOTES
Pt states dizziness when standing, writer observed pt to be unsteady. Writer attempted to get a set of ortho's. Pt was unable to stand long enough to get pressures as she was getting to weak and anxious.

## 2023-08-10 NOTE — H&P
Microscopic Urinalysis    Collection Time: 08/10/23  4:27 AM   Result Value Ref Range    WBC, UA 5 TO 10 0 - 5 /HPF    RBC, UA 0 TO 2 0 - 2 /HPF    Crystals, UA 0 TO 2 CALCIUM OXALATE (A) None /HPF    Epithelial Cells UA 2 TO 5 0 - 5 /HPF    Bacteria, UA MANY (A) None   Culture, Blood 1    Collection Time: 08/10/23  9:54 AM    Specimen: Blood   Result Value Ref Range    Specimen Description . BLOOD     Special Requests RIGHT HAND, 3ML     Culture NO GROWTH <24 HRS    Culture, Blood 2    Collection Time: 08/10/23  9:54 AM    Specimen: Blood   Result Value Ref Range    Specimen Description . BLOOD     Special Requests LEFT HAND, 3ML     Culture NO GROWTH <24 HRS    HIV Screen    Collection Time: 08/10/23  9:55 AM   Result Value Ref Range    HIV Ag/Ab NONREACTIVE NONREACTIVE   Transthoracic echocardiogram (TTE) complete with contrast, bubble, strain, and 3D PRN    Collection Time: 08/10/23 11:13 AM   Result Value Ref Range    LA Minor Axis 4.1 cm    LA Major Dickens 5.0 cm    LA Area 2C 11.0 cm2    LA Area 4C 13.3 cm2    LA Volume 2C 23 22 - 52 mL    LA Volume 4C 28 22 - 52 mL    LA Volume BP 28 22 - 52 mL    LA Diameter 3.0 cm    AV Mean Velocity 0.6 m/s    AV Mean Gradient 1 mmHg    AV VTI 16.2 cm    AV Peak Velocity 0.8 m/s    AV Peak Gradient 3 mmHg    Aortic Root 2.8 cm    IVSd 0.9 0.6 - 0.9 cm    LVIDd 4.0 3.9 - 5.3 cm    LVIDs 2.5 cm    LVOT Peak Velocity 0.6 m/s    LVOT Peak Gradient 2 mmHg    LVPWd 0.9 0.6 - 0.9 cm    LV E' Lateral Velocity 9 cm/s    LV E' Septal Velocity 6 cm/s    MV E Wave Deceleration Time 313.0 ms    MV A Velocity 0.52 m/s    MV E Velocity 0.65 m/s    TR Max Velocity 2.33 m/s    TR Peak Gradient 22 mmHg    Fractional Shortening 2D 38 28 - 44 %    LV RWT Ratio 0.45     LV Mass 2D 109.7 67 - 162 g    MV E/A 1.25     E/E' Ratio (Averaged) 9.03     E/E' Lateral 7.22     E/E' Septal 10.83     LA/AO Root Ratio 1.07     AV Velocity Ratio 0.75     Est. RA Pressure 3 mmHg    RVSP 25 mmHg Imaging/Diagnostics:  CT ABDOMEN PELVIS WO CONTRAST Additional Contrast? None    Result Date: 8/10/2023  Moderate left-sided pleural effusion. Small to moderate right-sided pleural effusion. Small pericardial effusion. Moderate amount of ascites. Moderate stool distending the rectum. Stool throughout the colon. Consider fecal impaction and constipation. Lack of intravenous contrast limits the exam. Severe 3rd spacing. XR CHEST PORTABLE    Result Date: 8/10/2023  Low lung volumes. Otherwise, no evidence of acute cardiopulmonary disease. Colonic distension, as before. CT CHEST ABDOMEN PELVIS W CONTRAST Additional Contrast? None    Result Date: 8/10/2023  Marked anasarca with moderate ascites and small bilateral pleural effusions not significantly changed. Large stool burden in the distal colon. Status post Daniel-en-Y gastric bypass. IVC filter in place.        Assessment :      Hospital Problems             Last Modified POA    * (Principal) Pericardial effusion 8/10/2023 Yes    Iron deficiency anemia 8/10/2023 Yes    Pulmonary embolism on right ([address]) 8/10/2023 Yes    Tobacco use 8/10/2023 Yes    Bilateral leg edema 8/10/2023 Yes    Anxiety 8/10/2023 Yes    History of gastric bypass 8/10/2023 Yes    Abdominal ascites 8/10/2023 Yes    Anasarca 8/10/2023 Yes    History of intravenous drug use in remission 8/10/2023 Yes    Acute cystitis without hematuria 8/10/2023 Yes    At risk for fluid volume overload 8/10/2023 Yes    Protein deficiency ([address]) 8/10/2023 Yes    Severe protein-calorie malnutrition ([address]) 8/10/2023 Yes       Plan:     Patient status inpatient in the  Progressive Unit/Step down    Fluid volume overload with new onset of pericardial effusion, anasarca, abdominal ascites, and gross third spacing with significant bilateral lower extremity edema of unknown/uncertain etiology  Diuretics with Lasix 40 twice daily  Severe malnutrition as evidenced by third spacing, albumin of 1.9, protein of 3.8,
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
## 2023-08-11 ENCOUNTER — APPOINTMENT (OUTPATIENT)
Dept: INTERVENTIONAL RADIOLOGY/VASCULAR | Age: 43
DRG: 421 | End: 2023-08-11
Payer: MEDICAID

## 2023-08-11 LAB
ALBUMIN SERPL-MCNC: 1.7 G/DL (ref 3.5–5.2)
ALP SERPL-CCNC: 138 U/L (ref 35–104)
ALT SERPL-CCNC: 28 U/L (ref 5–33)
ANION GAP SERPL CALCULATED.3IONS-SCNC: 7 MMOL/L (ref 9–17)
AST SERPL-CCNC: 32 U/L
BASOPHILS # BLD: 0.09 K/UL (ref 0–0.2)
BASOPHILS NFR BLD: 1 %
BILIRUB SERPL-MCNC: 0.2 MG/DL (ref 0.3–1.2)
BUN SERPL-MCNC: 27 MG/DL (ref 6–20)
BUN/CREAT SERPL: 30 (ref 9–20)
CALCIUM SERPL-MCNC: 7 MG/DL (ref 8.6–10.4)
CHLORIDE SERPL-SCNC: 103 MMOL/L (ref 98–107)
CO2 SERPL-SCNC: 25 MMOL/L (ref 20–31)
CREAT SERPL-MCNC: 0.9 MG/DL (ref 0.5–0.9)
ECHO AO ROOT DIAM: 2.8 CM
ECHO AV MEAN GRADIENT: 1 MMHG
ECHO AV MEAN VELOCITY: 0.6 M/S
ECHO AV PEAK GRADIENT: 3 MMHG
ECHO AV PEAK VELOCITY: 0.8 M/S
ECHO AV VELOCITY RATIO: 0.75
ECHO AV VTI: 16.2 CM
ECHO EST RA PRESSURE: 3 MMHG
ECHO LA AREA 2C: 11 CM2
ECHO LA AREA 4C: 13.3 CM2
ECHO LA DIAMETER: 3 CM
ECHO LA MAJOR AXIS: 5 CM
ECHO LA MINOR AXIS: 4.1 CM
ECHO LA TO AORTIC ROOT RATIO: 1.07
ECHO LA VOL 2C: 23 ML (ref 22–52)
ECHO LA VOL 4C: 28 ML (ref 22–52)
ECHO LA VOL BP: 28 ML (ref 22–52)
ECHO LV E' LATERAL VELOCITY: 9 CM/S
ECHO LV E' SEPTAL VELOCITY: 6 CM/S
ECHO LV FRACTIONAL SHORTENING: 38 % (ref 28–44)
ECHO LV INTERNAL DIMENSION DIASTOLIC: 4 CM (ref 3.9–5.3)
ECHO LV INTERNAL DIMENSION SYSTOLIC: 2.5 CM
ECHO LV IVSD: 0.9 CM (ref 0.6–0.9)
ECHO LV MASS 2D: 109.7 G (ref 67–162)
ECHO LV POSTERIOR WALL DIASTOLIC: 0.9 CM (ref 0.6–0.9)
ECHO LV RELATIVE WALL THICKNESS RATIO: 0.45
ECHO LVOT PEAK GRADIENT: 2 MMHG
ECHO LVOT PEAK VELOCITY: 0.6 M/S
ECHO MV A VELOCITY: 0.52 M/S
ECHO MV E DECELERATION TIME (DT): 313 MS
ECHO MV E VELOCITY: 0.65 M/S
ECHO MV E/A RATIO: 1.25
ECHO MV E/E' LATERAL: 7.22
ECHO MV E/E' RATIO (AVERAGED): 9.03
ECHO MV E/E' SEPTAL: 10.83
ECHO RIGHT VENTRICULAR SYSTOLIC PRESSURE (RVSP): 25 MMHG
ECHO TV REGURGITANT MAX VELOCITY: 2.33 M/S
ECHO TV REGURGITANT PEAK GRADIENT: 22 MMHG
EOSINOPHIL # BLD: 0 K/UL (ref 0–0.4)
EOSINOPHILS RELATIVE PERCENT: 0 % (ref 1–4)
ERYTHROCYTE [DISTWIDTH] IN BLOOD BY AUTOMATED COUNT: 24.6 % (ref 11.8–14.4)
GFR SERPL CREATININE-BSD FRML MDRD: >60 ML/MIN/1.73M2
GLUCOSE SERPL-MCNC: 89 MG/DL (ref 70–99)
HCT VFR BLD AUTO: 31.3 % (ref 36.3–47.1)
HGB BLD-MCNC: 9.2 G/DL (ref 11.9–15.1)
IMM GRANULOCYTES # BLD AUTO: 0 K/UL (ref 0–0.3)
IMM GRANULOCYTES NFR BLD: 0 %
INR PPP: 2
LYMPHOCYTES NFR BLD: 1.98 K/UL (ref 1–4.8)
LYMPHOCYTES RELATIVE PERCENT: 22 % (ref 24–44)
MAGNESIUM SERPL-MCNC: 1.7 MG/DL (ref 1.6–2.6)
MCH RBC QN AUTO: 23.4 PG (ref 25.2–33.5)
MCHC RBC AUTO-ENTMCNC: 29.4 G/DL (ref 28.4–34.8)
MCV RBC AUTO: 79.4 FL (ref 82.6–102.9)
MONOCYTES NFR BLD: 0.81 K/UL (ref 0.2–0.8)
MONOCYTES NFR BLD: 9 % (ref 1–7)
MORPHOLOGY: ABNORMAL
NEUTROPHILS NFR BLD: 68 % (ref 36–66)
NEUTS SEG NFR BLD: 6.12 K/UL (ref 1.8–7.7)
NRBC BLD-RTO: 0 PER 100 WBC
PLATELET # BLD AUTO: 340 K/UL (ref 138–453)
PMV BLD AUTO: 8.1 FL (ref 8.1–13.5)
POTASSIUM SERPL-SCNC: 3.3 MMOL/L (ref 3.7–5.3)
PROT SERPL-MCNC: 3.2 G/DL (ref 6.4–8.3)
PROTHROMBIN TIME: 22.6 SEC (ref 11.5–14.2)
RBC # BLD AUTO: 3.94 M/UL (ref 3.95–5.11)
SODIUM SERPL-SCNC: 135 MMOL/L (ref 135–144)
VANCOMYCIN SERPL-MCNC: 19.8 UG/ML
WBC OTHER # BLD: 9 K/UL (ref 3.5–11.3)

## 2023-08-11 PROCEDURE — 85025 COMPLETE CBC W/AUTO DIFF WBC: CPT

## 2023-08-11 PROCEDURE — 83735 ASSAY OF MAGNESIUM: CPT

## 2023-08-11 PROCEDURE — P9047 ALBUMIN (HUMAN), 25%, 50ML: HCPCS | Performed by: NURSE PRACTITIONER

## 2023-08-11 PROCEDURE — 6370000000 HC RX 637 (ALT 250 FOR IP): Performed by: NURSE PRACTITIONER

## 2023-08-11 PROCEDURE — 99232 SBSQ HOSP IP/OBS MODERATE 35: CPT | Performed by: NURSE PRACTITIONER

## 2023-08-11 PROCEDURE — 2060000000 HC ICU INTERMEDIATE R&B

## 2023-08-11 PROCEDURE — 80202 ASSAY OF VANCOMYCIN: CPT

## 2023-08-11 PROCEDURE — 80053 COMPREHEN METABOLIC PANEL: CPT

## 2023-08-11 PROCEDURE — 76705 ECHO EXAM OF ABDOMEN: CPT

## 2023-08-11 PROCEDURE — 36415 COLL VENOUS BLD VENIPUNCTURE: CPT

## 2023-08-11 PROCEDURE — 2580000003 HC RX 258: Performed by: NURSE PRACTITIONER

## 2023-08-11 PROCEDURE — 85610 PROTHROMBIN TIME: CPT

## 2023-08-11 PROCEDURE — 97162 PT EVAL MOD COMPLEX 30 MIN: CPT

## 2023-08-11 PROCEDURE — 97530 THERAPEUTIC ACTIVITIES: CPT

## 2023-08-11 PROCEDURE — 6360000002 HC RX W HCPCS: Performed by: NURSE PRACTITIONER

## 2023-08-11 RX ORDER — ALBUMIN (HUMAN) 12.5 G/50ML
25 SOLUTION INTRAVENOUS EVERY 8 HOURS
Status: COMPLETED | OUTPATIENT
Start: 2023-08-11 | End: 2023-08-11

## 2023-08-11 RX ORDER — CEPHALEXIN 250 MG/1
250 CAPSULE ORAL EVERY 6 HOURS SCHEDULED
Status: DISCONTINUED | OUTPATIENT
Start: 2023-08-11 | End: 2023-08-16 | Stop reason: HOSPADM

## 2023-08-11 RX ORDER — SODIUM CHLORIDE 9 MG/ML
INJECTION, SOLUTION INTRAVENOUS CONTINUOUS
Status: ACTIVE | OUTPATIENT
Start: 2023-08-11 | End: 2023-08-12

## 2023-08-11 RX ADMIN — APIXABAN 5 MG: 5 TABLET, FILM COATED ORAL at 21:23

## 2023-08-11 RX ADMIN — SODIUM CHLORIDE: 9 INJECTION, SOLUTION INTRAVENOUS at 19:23

## 2023-08-11 RX ADMIN — PIPERACILLIN AND TAZOBACTAM 3375 MG: 3; .375 INJECTION, POWDER, LYOPHILIZED, FOR SOLUTION INTRAVENOUS at 01:28

## 2023-08-11 RX ADMIN — IRON SUCROSE 300 MG: 20 INJECTION, SOLUTION INTRAVENOUS at 14:51

## 2023-08-11 RX ADMIN — CEPHALEXIN 250 MG: 250 CAPSULE ORAL at 14:46

## 2023-08-11 RX ADMIN — POTASSIUM BICARBONATE 40 MEQ: 782 TABLET, EFFERVESCENT ORAL at 06:46

## 2023-08-11 RX ADMIN — APIXABAN 5 MG: 5 TABLET, FILM COATED ORAL at 09:56

## 2023-08-11 RX ADMIN — CEPHALEXIN 250 MG: 250 CAPSULE ORAL at 23:48

## 2023-08-11 RX ADMIN — HYDROXYZINE HYDROCHLORIDE 100 MG: 25 TABLET, FILM COATED ORAL at 21:23

## 2023-08-11 RX ADMIN — CEPHALEXIN 250 MG: 250 CAPSULE ORAL at 09:56

## 2023-08-11 RX ADMIN — LEVOTHYROXINE SODIUM 25 MCG: 25 TABLET ORAL at 05:39

## 2023-08-11 RX ADMIN — SODIUM CHLORIDE: 9 INJECTION, SOLUTION INTRAVENOUS at 14:59

## 2023-08-11 RX ADMIN — CEPHALEXIN 250 MG: 250 CAPSULE ORAL at 17:54

## 2023-08-11 RX ADMIN — BUPRENORPHINE AND NALOXONE 2 FILM: 8; 2 FILM BUCCAL; SUBLINGUAL at 17:54

## 2023-08-11 RX ADMIN — ALBUMIN (HUMAN) 25 G: 0.25 INJECTION, SOLUTION INTRAVENOUS at 14:48

## 2023-08-11 RX ADMIN — ALPRAZOLAM 1 MG: 1 TABLET ORAL at 10:01

## 2023-08-11 RX ADMIN — ALBUMIN (HUMAN) 25 G: 0.25 INJECTION, SOLUTION INTRAVENOUS at 21:22

## 2023-08-11 RX ADMIN — PANTOPRAZOLE SODIUM 40 MG: 40 TABLET, DELAYED RELEASE ORAL at 05:39

## 2023-08-11 RX ADMIN — ONDANSETRON 4 MG: 4 TABLET, ORALLY DISINTEGRATING ORAL at 09:35

## 2023-08-11 ASSESSMENT — ENCOUNTER SYMPTOMS
NAUSEA: 0
VOMITING: 0
CONSTIPATION: 0
WHEEZING: 0
COLOR CHANGE: 0
COUGH: 0
SHORTNESS OF BREATH: 1
ABDOMINAL PAIN: 0
SINUS PRESSURE: 0
SINUS PAIN: 0
DIARRHEA: 0
PHOTOPHOBIA: 0

## 2023-08-11 NOTE — PLAN OF CARE
Pt alert and oriented. VSS. NSR on tele. SpO2 mid 90s on RA. Afebrile. No c/o pain. Pt receiving IV lasix and IV abx. Zuñiga placed d/t retention. NPO for paracentesis today. K replaced. Bed alarm on, call light within reach. Will monitor. Problem: Discharge Planning  Goal: Discharge to home or other facility with appropriate resources  8/11/2023 7950 by Warren Ruvalcaba RN  Outcome: Progressing  8/10/2023 1944 by Edward Marie RN  Outcome: Progressing  Flowsheets (Taken 8/10/2023 0908 by Melanie Carvalho RN)  Discharge to home or other facility with appropriate resources: Identify barriers to discharge with patient and caregiver     Problem: Respiratory - Adult  Goal: Achieves optimal ventilation and oxygenation  Outcome: Progressing     Problem: Safety - Adult  Goal: Free from fall injury  8/11/2023 7899 by Warren Ruvalcaba RN  Outcome: Progressing  8/10/2023 1944 by Edward Marie RN  Outcome: Progressing     Problem: ABCDS Injury Assessment  Goal: Absence of physical injury  8/11/2023 0290 by Warren Ruvalcaba RN  Outcome: Progressing  8/10/2023 1944 by Edward Marie RN  Outcome: Progressing     Problem: Pain  Goal: Verbalizes/displays adequate comfort level or baseline comfort level  8/11/2023 0632 by Warren Ruvalcaba RN  Outcome: Progressing  8/10/2023 1944 by Edward Marie RN  Outcome: Progressing     Problem: Musculoskeletal - Adult  Goal: Return mobility to safest level of function  8/11/2023 0632 by Warren Ruvalcaba RN  Outcome: Progressing  8/10/2023 1944 by Edward Marie RN  Outcome: Progressing  Goal: Return ADL status to a safe level of function  8/11/2023 0632 by Warren Ruvalcaba RN  Outcome: Progressing  8/10/2023 1944 by Edward Marie RN  Outcome: Progressing     Problem: Skin/Tissue Integrity  Goal: Absence of new skin breakdown  Description: 1. Monitor for areas of redness and/or skin breakdown  2. Assess vascular access sites hourly  3. Every 4-6 hours minimum:  Change oxygen saturation probe site  4.

## 2023-08-11 NOTE — FLOWSHEET NOTE
Pt to IR for paracentesis abd scanned by IR physician not enough fluid identified to be safely removed procedure cancelled pt returned to room.

## 2023-08-11 NOTE — PROGRESS NOTES
Physical Therapy  Facility/Department: [facility] PROGRESSIVE CARE  Physical Therapy Initial Assessment    Name: Yumiko Olson  : 1980  MRN: [id number]  Date of Service: 2023    Discharge Recommendations:  Patient would benefit from continued therapy after discharge          Patient Diagnosis(es): The primary encounter diagnosis was Pericardial effusion. Diagnoses of Other ascites, Dyspnea and respiratory abnormalities, and Pleural effusion were also pertinent to this visit. Past Medical History:  has a past medical history of Anemia requiring transfusions, Anxiety, Cervical neuritis, Depression, Frequent UTI, [place] filter in place, Meningioma ([address]), Migraines, Scoliosis, Weakness of left arm, and Wears dentures. Past Surgical History:  has a past surgical history that includes Adenoidectomy; Tonsillectomy; Ankle surgery; Gastric bypass surgery; Dilation and curettage of uterus;  section; Tubal ligation; Dental surgery; and Cervical spine surgery (2016). Assessment   Body Structures, Functions, Activity Limitations Requiring Skilled Therapeutic Intervention: Decreased functional mobility ; Decreased ROM; Decreased endurance;Decreased balance;Decreased posture    Assessment: Pt presents on hospitalization day one with severe functional weakness and intolerance to mobility due to extreme malnutrion from prior bariatric surgery -> pt reports she weighed 460 + lbs 17 years ago and would weigh less than 178 if the water weight wasn't there. Follow along medically while hospitalized. Pt with decreased safety awareness and pt did request to have a coke to drink instead of her breakfast after therapy to the nutrition assistant. Will continue to work with patient in small amounts to prevent seconardy hospital complications.     Specific Instructions for Next Treatment: gait / standing balance; keep exercise light  Therapy Prognosis: Good  Decision Making: Medium Complexity  Clinical Presentation:

## 2023-08-11 NOTE — PLAN OF CARE
Pt is currently in bed resting comfortably awaiting IR for paracentesis. Paracentesis has been cancelled d/t no fluid available to be removed. Fluid is coming from third spacing. New orders put in for high calorie diet and supplement protein. Pt needs encouragement to continue progressing and eating. Albumin replaced. See MAR. Urology consulted and assessed pt. See note. Pt has had complaints of anxiety, xanax PRN given. See MAR. Standard safety measures in place, bed in lowest and locked position. All questions and concerns have been addressed. Will continue to monitor and provide support. Problem: Discharge Planning  Goal: Discharge to home or other facility with appropriate resources  8/11/2023 1804 by Deya Rios RN  Outcome: Progressing     Problem: Respiratory - Adult  Goal: Achieves optimal ventilation and oxygenation  8/11/2023 1804 by Deya Rios RN  Outcome: Progressing     Problem: Safety - Adult  Goal: Free from fall injury  8/11/2023 1804 by Deya Rios RN  Outcome: Progressing     Problem: ABCDS Injury Assessment  Goal: Absence of physical injury  8/11/2023 1804 by Deya Rios RN  Outcome: Progressing     Problem: Pain  Goal: Verbalizes/displays adequate comfort level or baseline comfort level  8/11/2023 1804 by Deya Rios RN  Outcome: Progressing     Problem: Nutrition Deficit:  Goal: Optimize nutritional status  Outcome: Progressing     Problem: Skin/Tissue Integrity  Goal: Absence of new skin breakdown  Description: 1. Monitor for areas of redness and/or skin breakdown  2. Assess vascular access sites hourly  3. Every 4-6 hours minimum:  Change oxygen saturation probe site  4. Every 4-6 hours:  If on nasal continuous positive airway pressure, respiratory therapy assess nares and determine need for appliance change or resting period.   8/11/2023 1804 by Deya Rios RN  Outcome: Progressing [facility] and [address] at [facility] at [facility] [place], [address] [facility] [address] [facility], [address] [place] [address]  [phone] Office  [phone] Facsimile           Referring: Jonatan Boo MD      Chief Complaint   Patient presents with    New Patient     \"cavernous angiomas\"    Headache    Dizziness    Neurologic Problem     loss of balance     41-year-old woman who presents today for evaluation of what she calls dizzy, eyes crossing, no memory, headaches. She is a poor historian and she is quite suggestible. In any regard this lady notes that she has had ongoing dizziness and imbalance and memory loss and things have been getting progressively worse. She went to the emergency department recently secondary to an episode where she was dizzy described as inability to stand and her balance was worse than it normally is. She says that her eyes were crossing. She did not lose vision but it was blurred and she had the black spots in her vision. She had to hold on to walk. She went to the ED and had a CT scan of her head done that was normal and she was discharged. She says that she has extreme photophobia and cannot stand the light. She says that whenever she moves she gets this dizziness eyes will cross that she cannot control it. Denies loss of consciousness. Denies focal weakness. She says she has had chest pain and palpitations and shortness of breath as well as numbness and tingling. She has memory loss and she cannot remember anything. Thoughts are getting worse. She says that she is so tired of people giving her medicine because in [place] they would have her in the hospital doing every test in the world but here people just give her medicine. She denies any fever. She says she is fatigued.   She says she does not want to talk about the medicines because she has stopped taking the trazodone because she does not get help with sleep but it makes her sleepy during the day but not at night. She is only taking her Chantix once a day and not twice a day. She is really tangential and difficult to follow. Review of the electronic medical record finds she had an MRI of the brain performed March 2018 and compared to her September 2013 scan. She is status post left parietal craniotomy with encephalomalacia in the left parietal lobe. She has hemosiderin along that area of encephalomalacia. 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 Chart review also finds I saw her back in 2013 and at that time she was brought to the emergency department by her daughter for abnormal movements question seizure. Past history was noted for her to have an AVM that bled in [place] and had surgery in the late 1990s as well as another in 2006. She was said to have had seizures in the past and did not like the way of the medicine made her feel so stopped taking it. At the time of that consultation she says she was not interested in taking any anticonvulsants. She had stopped her anticonvulsants about a month before that admission to [address] [place] [address]. Dilaudid etc. for migraines. We discussed not using those medications to treat migraine.   Chart also indicates a history of not following through with recommendations  Past Medical History:   Diagnosis Date    Cavernous hemangioma of intracranial structure ([facility] 5/2/2011    Depressive disorder, not elsewhere classified 10/1/2013    anxiety    Fibromyalgia     fibromyalgia    Hiatal hernia with gastroesophageal reflux 04/2018    Hypertension     Incomplete right bundle branch block (RBBB) determined by electrocardiography 05/10/2018    Migraine 5/2/2011    Seizures ([address] 2012    last seizure grand mal (born w/seizure disorder)    Thyroid nodule     Unspecified sleep apnea     does not use c-pap       Past Surgical History: Procedure Laterality Date    HX CHOLECYSTECTOMY      HX HEENT  2012    tonsillectomy    HX LITHOTRIPSY      HX ORTHOPAEDIC      disc fusion 2010, right knee cyst removed    NEUROLOGICAL PROCEDURE UNLISTED  1996, 2006    Cavernous brain angioma(s) removed    SURGERY,BRAIN/SPINE,W/COMPUTER         Current Outpatient Medications   Medication Sig Dispense Refill    pantoprazole (PROTONIX) 20 mg tablet Take 1 Tab by mouth daily. 20 Tab 0    docusate sodium (STOOL SOFTENER) 50 mg capsule Take 50 mg by mouth nightly.  DULoxetine (CYMBALTA) 30 mg capsule Take 30 mg by mouth nightly.  DULoxetine (CYMBALTA) 60 mg capsule Take 60 mg by mouth nightly.  losartan-hydroCHLOROthiazide (HYZAAR) 100-25 mg per tablet Take 1 Tab by mouth nightly.  topiramate (TOPAMAX) 25 mg tablet Take 25 mg by mouth two (2) times a day.  tiZANidine (ZANAFLEX) 4 mg capsule Take 4 mg by mouth three (3) times daily.  varenicline (CHANTIX) 1 mg tablet Take 1 mg by mouth daily.  ALPRAZolam (XANAX) 1 mg tablet Take 1 mg by mouth three (3) times daily as needed.  butalbital-acetaminophen-caff (FIORICET) -40 mg per capsule Take 1-2 Caps by mouth every four (4) hours as needed for Pain. (Patient not taking: Reported on 4/29/2019) 15 Cap 0    HYDROmorphone (DILAUDID) 2 mg tablet Take 1-2 Tabs by mouth every four (4) hours as needed. Max Daily Amount: 24 mg. (Patient not taking: Reported on 4/29/2019) 30 Tab 0    busPIRone (BUSPAR) 15 mg tablet Take  by mouth nightly.  traZODone (DESYREL) 100 mg tablet Take 300 mg by mouth nightly.  1-3 tabs per night          Allergies   Allergen Reactions    Latex, Natural Rubber Rash    Aspirin Other (comments)     Excessive bleeding      Codeine Palpitations    Contrast Agent [Iodine] Rash     Pt states she is not allergic    Imitrex [Sumatriptan] Palpitations and Other (comments)     thougt she was having an MI    Percocet [Oxycodone-Acetaminophen] Palpitations       Social History     Tobacco Use    Smoking status: Former Smoker    Smokeless tobacco: Never Used    Tobacco comment: on chantix,   Substance Use Topics    Alcohol use: No    Drug use: No       Family History   Problem Relation Age of Onset    Cancer Mother         breast    Breast Cancer Mother     Cancer Father     Migraines Father     Diabetes Father      Review of Systems  Pertinent positives and negatives as noted with remainder of comprehensive review negative    Examination  Visit Vitals  BP (!) 132/92 (BP 1 Location: Left arm, BP Patient Position: Sitting)   Pulse 93   Resp 20   Ht 5' 4\" (1.626 m)   Wt 105.2 kg (232 lb)   LMP 05/04/2011   SpO2 96%   BMI 39.82 kg/m²     She is a pleasant lady. She is a bit disheveled. She has an odd affect. No scleral icterus. Oropharynx is clear moist.  Her neck is supple I do not hear bruit. Her heart is regular. Her pulses are symmetrical.  No edema of the extremities. She does have clubbing of the fingers. Neurologically she is awake alert and oriented. Again tangential thought process. Pressured speech at times. She has reactive pupils. Disc margins flat bilaterally. Full versions without nystagmus. Symmetric face and facial sensation. Tongue and palate are midline. She has no abnormal movement. No abnormality of bulk or tone. She resists fully in the upper and lower extremities in all muscle groups to direct testing. There is no pronation or drift. She has asymmetric reflexes being more brisk right upper and lower than the right. No pathologic reflexes elicited. No ataxia. Sensory is unreliable. Gait is steady    Impression/Plan  70-year-old lady status post craniotomy left parietal for AVM status post hemorrhage with multitude of symptoms as above.   Certainly the tangential nature of her history and the pressured speech would make one a bit wary about her complaints however she certainly has real disease and has had real issues and therefore we need to evaluate this quite seriously. She will undergo MRI of the brain to evaluate both the known previous AVM region and also the abnormality in the foramen of [place] particularly looking for any increase in size of the cyst which could cause occlusive issue. Also look for any new signs of bleeding/hemosiderin staining that would indicate that there is something more ominous ongoing. She will get any allergy to evaluate for baseline rhythms as well as to evaluate for epileptiform abnormalities as she does have history of seizure and is off antiepileptic drugs. She will undergo formal neuropsychological evaluation regarding her memory loss complaints but also this may help sort some of the underlying psychopathology. Visual evoked potential, Doppler and bare will round out the evaluation    She will return at the completion of her studies    Alida Gallardo MD    This note was created using voice recognition software. Despite editing, there may be syntax errors. This note will not be viewable in [address]

## 2023-08-11 NOTE — PROGRESS NOTES
[facility]  Office: [address], DO, Tesfaye Gambleter, DO, Vaishali Villeda, DO, Sarahjoe Buckley Blood, DO, Thong Jones MD, Myke Rodgers MD, Jennifer Patterson MD, Saige Hamm MD,  Margie Hong MD, Sahil Domínguez MD, Gene Cleaning, DO, Wai Deal MD,  Salem Aase, MD, Karolee Holter, MD, Rose Anand, DO, Jazmin Mendez MD,  Letitia Bui, DO, David Caro MD, Ashley Rodriguez MD, Hussein Haq MD, Malissa Stahl MD,  Cristino Bergeron MD, Zeus Del Rio MD, Yan Marie MD, Tab Cavanaugh, DO, Lara Hare MD,  Nahun Burch MD, Noel Matthews, CNP,  Gm Hassan, CNP, Yves Mckeon, CNP, Lizy Sears, CNP,  Tristan Samuel, [facility], Brad Ovalle, CNP, Swetha Ortiz, CNP, Griselda Jenkins, CNP, Evangelist Lyon, CNP, Darin Cornell, CNP, YOLANDA NavarroC, Fili Cervantes, CNS, Nella Miller, CNP, Sharmaine Asher, [address]    Progress Note    8/11/2023    10:55 AM    Name:   Leah James  MRN:     [id number]     Acct:      [de-identified]   Room:   [address] [place] [address] Day:  1  Admit Date:  8/10/2023  1:01 AM    PCP:   KETTY Pop NP  Code Status:  Full Code    Subjective:     C/C:   Chief Complaint   Patient presents with    Swelling     Here recently and sent home with water pills, but out of them and swelling is worsening     Interval History Status: improved. Sepsis, HIV, hepatitis B and C, acute liver failure have fortunately all been ruled out. Patient is third spacing is most likely from severe protein deficiency from malnutrition from her gastric bypass and presumed short gut syndrome. Plan for iron replacement, protein calorie supplements, diet advancement. Due to hypotension and severe third spacing we will attempt fluid resuscitation and albumin today. Consult urology for acute retention, consult GI for long-term input on treatment.   Ultimately patient will need to follow-up with the bariatric surgical center however this is problematic due to her interstate insurance. Brief History:     8/10 - Patient reports to the emergency department with diffuse bilateral lower extremity swelling, anasarca, and ascites. She reports that this has been waxing and waning in intensity for the past 1 year but over the past 3 days it has dramatically increased. She was seen and evaluated approximately 2 weeks ago where she was found to have a pulmonary emboli and was started on Eliquis. At that time she was having what she described as moderate lower extremity edema and was started on diuretics. Patient did not follow-up and ran out of the hospital supplied prescription for Lasix. She reports since that time she had a dramatic increase in her edema to the point she can no longer ambulate more than a few steps. She is also short of breath with exertion and feels as though she cannot take a deep breath. Patient has no history of heart failure, no known liver failure. 8/11 - Sepsis, HIV, hepatitis B and C, acute liver failure have fortunately all been ruled out. Patient is third spacing is most likely from severe protein deficiency from malnutrition from her gastric bypass and presumed short gut syndrome. Plan for iron replacement, protein calorie supplements, diet advancement. Due to hypotension and severe third spacing we will attempt fluid resuscitation and albumin today. Review of Systems:     Review of Systems   Constitutional:  Positive for activity change and fatigue. Negative for chills and fever. HENT:  Negative for sinus pressure and sinus pain. Eyes:  Negative for photophobia and visual disturbance. Respiratory:  Positive for shortness of breath. Negative for cough and wheezing. Cardiovascular:  Positive for leg swelling. Negative for chest pain and palpitations.    Gastrointestinal:  Negative for abdominal pain, constipation, diarrhea, nausea and

## 2023-08-11 NOTE — PROGRESS NOTES
Nutrition Education    Educated on Low albumin, low iron, heart healthy diet. Importance of protein absorption with small frequent meals, use Protein supplements between meals for max absorption. Learners: Patient  Readiness: Eager  Method: Explanation and Handout  Response: Verbalizes Understanding  Contact name and number provided.     Adenike Corbett RD  Contact Number: [phone]

## 2023-08-12 LAB
ALBUMIN SERPL-MCNC: 1.8 G/DL (ref 3.5–5.2)
ALP SERPL-CCNC: 94 U/L (ref 35–104)
ALT SERPL-CCNC: 24 U/L (ref 5–33)
ANION GAP SERPL CALCULATED.3IONS-SCNC: 3 MMOL/L (ref 9–17)
AST SERPL-CCNC: 24 U/L
BASOPHILS # BLD: 0.08 K/UL (ref 0–0.2)
BASOPHILS NFR BLD: 1 %
BILIRUB SERPL-MCNC: 0.2 MG/DL (ref 0.3–1.2)
BUN SERPL-MCNC: 17 MG/DL (ref 6–20)
BUN/CREAT SERPL: 28 (ref 9–20)
CALCIUM SERPL-MCNC: 6.8 MG/DL (ref 8.6–10.4)
CHLORIDE SERPL-SCNC: 110 MMOL/L (ref 98–107)
CO2 SERPL-SCNC: 25 MMOL/L (ref 20–31)
CREAT SERPL-MCNC: 0.6 MG/DL (ref 0.5–0.9)
EOSINOPHIL # BLD: 0.08 K/UL (ref 0–0.4)
EOSINOPHILS RELATIVE PERCENT: 1 % (ref 1–4)
ERYTHROCYTE [DISTWIDTH] IN BLOOD BY AUTOMATED COUNT: 24.8 % (ref 11.8–14.4)
GFR SERPL CREATININE-BSD FRML MDRD: >60 ML/MIN/1.73M2
GLUCOSE SERPL-MCNC: 90 MG/DL (ref 70–99)
HCT VFR BLD AUTO: 27 % (ref 36.3–47.1)
HGB BLD-MCNC: 7.8 G/DL (ref 11.9–15.1)
IMM GRANULOCYTES # BLD AUTO: 0 K/UL (ref 0–0.3)
IMM GRANULOCYTES NFR BLD: 0 %
LYMPHOCYTES NFR BLD: 1.25 K/UL (ref 1–4.8)
LYMPHOCYTES RELATIVE PERCENT: 16 % (ref 24–44)
MCH RBC QN AUTO: 23.1 PG (ref 25.2–33.5)
MCHC RBC AUTO-ENTMCNC: 28.9 G/DL (ref 28.4–34.8)
MCV RBC AUTO: 80.1 FL (ref 82.6–102.9)
MONOCYTES NFR BLD: 0.62 K/UL (ref 0.2–0.8)
MONOCYTES NFR BLD: 8 % (ref 1–7)
MORPHOLOGY: ABNORMAL
NEUTROPHILS NFR BLD: 74 % (ref 36–66)
NEUTS SEG NFR BLD: 5.77 K/UL (ref 1.8–7.7)
NRBC BLD-RTO: 0 PER 100 WBC
PLATELET # BLD AUTO: 297 K/UL (ref 138–453)
PMV BLD AUTO: 8.4 FL (ref 8.1–13.5)
POTASSIUM SERPL-SCNC: 3.8 MMOL/L (ref 3.7–5.3)
PROT SERPL-MCNC: 3 G/DL (ref 6.4–8.3)
RBC # BLD AUTO: 3.37 M/UL (ref 3.95–5.11)
SODIUM SERPL-SCNC: 138 MMOL/L (ref 135–144)
WBC OTHER # BLD: 7.8 K/UL (ref 3.5–11.3)

## 2023-08-12 PROCEDURE — 80053 COMPREHEN METABOLIC PANEL: CPT

## 2023-08-12 PROCEDURE — 2060000000 HC ICU INTERMEDIATE R&B

## 2023-08-12 PROCEDURE — 85025 COMPLETE CBC W/AUTO DIFF WBC: CPT

## 2023-08-12 PROCEDURE — 6370000000 HC RX 637 (ALT 250 FOR IP): Performed by: NURSE PRACTITIONER

## 2023-08-12 PROCEDURE — 36415 COLL VENOUS BLD VENIPUNCTURE: CPT

## 2023-08-12 PROCEDURE — 99232 SBSQ HOSP IP/OBS MODERATE 35: CPT | Performed by: NURSE PRACTITIONER

## 2023-08-12 PROCEDURE — 6360000002 HC RX W HCPCS: Performed by: NURSE PRACTITIONER

## 2023-08-12 PROCEDURE — 2580000003 HC RX 258: Performed by: NURSE PRACTITIONER

## 2023-08-12 RX ADMIN — IRON SUCROSE 300 MG: 20 INJECTION, SOLUTION INTRAVENOUS at 15:11

## 2023-08-12 RX ADMIN — LEVOTHYROXINE SODIUM 25 MCG: 25 TABLET ORAL at 05:08

## 2023-08-12 RX ADMIN — PANTOPRAZOLE SODIUM 40 MG: 40 TABLET, DELAYED RELEASE ORAL at 05:08

## 2023-08-12 RX ADMIN — CEPHALEXIN 250 MG: 250 CAPSULE ORAL at 11:45

## 2023-08-12 RX ADMIN — BUPRENORPHINE AND NALOXONE 2 FILM: 8; 2 FILM BUCCAL; SUBLINGUAL at 15:05

## 2023-08-12 RX ADMIN — APIXABAN 5 MG: 5 TABLET, FILM COATED ORAL at 21:52

## 2023-08-12 RX ADMIN — CEPHALEXIN 250 MG: 250 CAPSULE ORAL at 05:08

## 2023-08-12 RX ADMIN — APIXABAN 5 MG: 5 TABLET, FILM COATED ORAL at 09:03

## 2023-08-12 RX ADMIN — CEPHALEXIN 250 MG: 250 CAPSULE ORAL at 23:28

## 2023-08-12 RX ADMIN — CEPHALEXIN 250 MG: 250 CAPSULE ORAL at 17:09

## 2023-08-12 RX ADMIN — SODIUM CHLORIDE, PRESERVATIVE FREE 10 ML: 5 INJECTION INTRAVENOUS at 09:03

## 2023-08-12 RX ADMIN — HYDROXYZINE HYDROCHLORIDE 100 MG: 25 TABLET, FILM COATED ORAL at 21:52

## 2023-08-12 RX ADMIN — SODIUM CHLORIDE: 9 INJECTION, SOLUTION INTRAVENOUS at 15:08

## 2023-08-12 RX ADMIN — ALPRAZOLAM 1 MG: 1 TABLET ORAL at 23:28

## 2023-08-12 RX ADMIN — ALPRAZOLAM 1 MG: 1 TABLET ORAL at 11:45

## 2023-08-12 RX ADMIN — ONDANSETRON 4 MG: 2 INJECTION INTRAMUSCULAR; INTRAVENOUS at 19:45

## 2023-08-12 ASSESSMENT — ENCOUNTER SYMPTOMS
ABDOMINAL PAIN: 0
CONSTIPATION: 0
WHEEZING: 0
SHORTNESS OF BREATH: 1
DIARRHEA: 0
PHOTOPHOBIA: 0
SINUS PRESSURE: 0
COUGH: 0
COLOR CHANGE: 0
VOMITING: 0
NAUSEA: 0
SINUS PAIN: 0

## 2023-08-12 NOTE — PROGRESS NOTES
[facility]  Office: [address], DO, Patricia McRae, DO, Ailyn Guidoia, DO, William Bradford Blood, DO, Christie Merritt MD, Smita Mccullough MD, Bouchra Barrera MD, Alondra Whiteside MD,  Gayla Gutierres MD, Lorie Nyhan, MD, Javier Gutierrez, DO, Michelle Hill MD,  Prashanth Connors MD, Leisa Bowles MD, Carmen Hamm DO, Cecilio Velazquez MD,  Franchesca Cerda DO, Kenyon Tanner MD, Mary Ann Mccann MD, Rayray Jamison MD, Lianet Tabor MD,  Pedro Camargo MD, Letitia Blum MD, Micheline Dandy, MD, Malik Roman, DO, Michell Templeton MD,  Maddy Brooks MD, Tony Grissom, CNP,  Carole Hogue, CNP, Armida Martinez, CNP, Jodee Good, CNP,  Raissa Butler, [facility], Nancy Woods, CNP, Lana Peace, CNP, Bardsakina Morse, CNP, Germaine Randhawa, CNP, Michael Lopez, CNP, YOLANDA MaciasC, Annie Tomas, [facility], Clemente Rolon, [facility], Adeel Segura, [facility]    Progress Note    8/12/2023    11:58 AM    Name:   Becca Zabala  MRN:     [id number]     Acct:      [de-identified]   Room:   [address] [place] [address] Day:  2  Admit Date:  8/10/2023  1:01 AM    PCP:   KETTY Cardoso NP  Code Status:  Full Code    Subjective:     C/C:   Chief Complaint   Patient presents with    Swelling     Here recently and sent home with water pills, but out of them and swelling is worsening     Interval History Status: improved. Patient is third spacing is most likely from severe protein deficiency from malnutrition from her gastric bypass and presumed short gut syndrome. Plan for iron replacement, protein calorie supplements, diet advancement. Will likely require SNF to address her severe weakness and fatigue secondary to severe protein malnutrition. Case is discussed with gastroenterology and general surgery.   Neither service has acute input and both have recommended that she follow with a bariatric surgical center to discuss nutritional Pulses: Normal pulses. Heart sounds: Normal heart sounds. Pulmonary:      Effort: Respiratory distress present. Breath sounds: Wheezing present. Abdominal:      General: Bowel sounds are normal. There is distension. Palpations: Abdomen is soft. Tenderness: There is abdominal tenderness. Musculoskeletal:      Right lower leg: Edema present. Left lower leg: Edema present. Skin:     General: Skin is warm. Coloration: Skin is pale. Neurological:      General: No focal deficit present. Mental Status: She is alert and oriented to person, place, and time. Assessment:        Hospital Problems             Last Modified POA    * (Principal) Pericardial effusion 8/10/2023 Yes    Iron deficiency anemia 8/10/2023 Yes    Pulmonary embolism on right ([address]) 8/10/2023 Yes    Tobacco use 8/10/2023 Yes    Bilateral leg edema 8/10/2023 Yes    Anxiety 8/10/2023 Yes    History of gastric bypass 8/10/2023 Yes    Abdominal ascites 8/10/2023 Yes    Anasarca 8/10/2023 Yes    History of intravenous drug use in remission 8/10/2023 Yes    Acute cystitis without hematuria 8/10/2023 Yes    At risk for fluid volume overload 8/10/2023 Yes    Protein deficiency ([address]) 8/10/2023 Yes    Severe protein-calorie malnutrition ([address]) 8/10/2023 Yes     Plan:        Fluid volume overload with new onset of pericardial effusion, anasarca, abdominal ascites, and gross third spacing with significant bilateral lower extremity edema of unknown/uncertain etiology  Albumin and fluid resuscitation  Severe malnutrition as evidenced by third spacing, albumin of 1.9, protein of 3.8, iron deficiency anemia, history of short gut syndrome.   IV Venofer  Dietary consultation  Will need to follow-up outpatient with bariatric center  Acute cystitis without hematuria with concern for sepsis of unknown etiology  Narrow antibiotic coverage for UTI and discontinue broad-spectrum  Pulmonary embolism on right with exertional dyspnea on

## 2023-08-12 NOTE — PLAN OF CARE
Pt alert and oriented. VSS. NSR/SB on tele. SpO2 mid 90s on RA. Afebrile. No c/o pain. Lasix on hold d/t soft pressures. Pt receiving IVF and po abx. Zuñiga in place and patent. D/C planning pending. Bed alarm on, call light within reach. Will monitor. Problem: Discharge Planning  Goal: Discharge to home or other facility with appropriate resources  8/12/2023 0450 by Yumiko Naylor RN  Outcome: Progressing  8/11/2023 1804 by Lavinia Chavez RN  Outcome: Progressing     Problem: Respiratory - Adult  Goal: Achieves optimal ventilation and oxygenation  8/12/2023 0450 by Yumiko Naylor RN  Outcome: Progressing  8/11/2023 1804 by Lavinia Chavez RN  Outcome: Progressing     Problem: Safety - Adult  Goal: Free from fall injury  8/12/2023 0450 by Yumiko Naylor RN  Outcome: Progressing  8/11/2023 1804 by Lavinia Chavez RN  Outcome: Progressing  8/11/2023 1804 by Lavinia Chavez RN  Outcome: Progressing     Problem: ABCDS Injury Assessment  Goal: Absence of physical injury  8/12/2023 0450 by Yumiko Naylor RN  Outcome: Progressing  8/11/2023 1804 by Lavinia Chavez RN  Outcome: Progressing     Problem: Pain  Goal: Verbalizes/displays adequate comfort level or baseline comfort level  8/12/2023 0450 by Yumiko Naylor RN  Outcome: Progressing  8/11/2023 1804 by Lavinia Chavez RN  Outcome: Progressing     Problem: Musculoskeletal - Adult  Goal: Return mobility to safest level of function  8/12/2023 0450 by Yumiko Naylor RN  Outcome: Progressing  8/11/2023 1804 by Lavinia Chavez RN  Outcome: Progressing  Goal: Return ADL status to a safe level of function  8/12/2023 0450 by Yumiko Naylor RN  Outcome: Progressing  8/11/2023 1804 by Lavinia Chavez RN  Outcome: Progressing     Problem: Skin/Tissue Integrity  Goal: Absence of new skin breakdown  Description: 1. Monitor for areas of redness and/or skin breakdown  2. Assess vascular access sites hourly  3. Every 4-6 hours minimum:  Change oxygen saturation probe site  4.   Every 4-6

## 2023-08-12 NOTE — PLAN OF CARE
Pt alert and oriented. Pt continues to require assistance with care and ambulation. Encouraged to participate in own care but needs encouragement. Pt declines jessy care and yadav care in the morning. Much education provided about importance of preventing infection. Pt states she \"feels sick\" and continues to decline. Pt has scars to BUE. Pt has what appears to be new scabs around shoulders and chest area. Pt restless in the afternoon, with legs up and down from bed. Pt finally relaxes. Bed alarm remains activated. Pt states several times throughout the day that she is throwing up. When questioned she is unable to state where or when. No staff able to verify either. Safety measures remain in place.        Problem: Respiratory - Adult  Goal: Achieves optimal ventilation and oxygenation  8/12/2023 1045 by Solitario Chu RN  Outcome: Progressing     Problem: Musculoskeletal - Adult  Goal: Return mobility to safest level of function  8/12/2023 1045 by Solitario Chu RN  Outcome: Progressing     Problem: Nutrition Deficit:  Goal: Optimize nutritional status  8/12/2023 1045 by Solitario Chu RN  Outcome: Progressing

## 2023-08-12 NOTE — CONSULTS
[Held by provider] furosemide  40 mg IntraVENous BID    iron sucrose  300 mg IntraVENous Q24H    milk and molasses  240 mL Rectal Once     Continuous Infusions:   sodium chloride 5 mL/hr at 08/12/23 1508     PRN Meds:.albuterol sulfate HFA, potassium chloride **OR** potassium alternative oral replacement **OR** potassium chloride, magnesium sulfate, ondansetron **OR** ondansetron, polyethylene glycol, acetaminophen **OR** acetaminophen, perflutren lipid microspheres, sodium chloride flush, sodium chloride, ALPRAZolam    Allergies:  Ciprofloxacin, Codeine, Compazine [prochlorperazine maleate], and Indocin [indomethacin]    Social History:    Social History     Socioeconomic History    Marital status:      Spouse name: Not on file    Number of children: 2    Years of education: Not on file    Highest education level: Not on file   Occupational History    Occupation: unemployed   Tobacco Use    Smoking status: Every Day     Packs/day: 0.30     Years: 8.00     Pack years: 2.40     Types: Cigarettes    Smokeless tobacco: Never   Vaping Use    Vaping Use: Never used   Substance and Sexual Activity    Alcohol use: No    Drug use: Yes     Types: Opiates      Comment: clean since 2016, takes suboxin    Sexual activity: Yes     Partners: Male   Other Topics Concern    Not on file   Social History Narrative    Not on file     Social Determinants of Health     Financial Resource Strain: Not on file   Food Insecurity: Not on file   Transportation Needs: Not on file   Physical Activity: Not on file   Stress: Not on file   Social Connections: Not on file   Intimate Partner Violence: Not on file   Housing Stability: Not on file       Family History:  History reviewed. No pertinent family history.   Previous Urologic Family history: none  REVIEW OF SYSTEMS:  Constitutional: negative generalized weakness  Eyes: negative  Respiratory: n see history of present illness  Cardiovascular: See history of present positive. Unable to confirm due to unavailability of reagent. *        -----------------------------------------------------------------  Imaging Results:  FINDINGS:  Lower Chest: There is a moderate left-sided pleural effusion. There is a  small to moderate right-sided pleural effusion. Coronary calcifications are  present. There is a small pericardial effusion. There is mild bibasilar  atelectasis     Organs: Colonic interposition is noted. Focal hepatic abnormality is not  appreciated. The gallbladder is mildly distended. There is a moderate  amount of ascites. A focal splenic abnormality is not appreciated. A focal  pancreatic abnormality is not identified. The adrenal glands are  unremarkable. The kidneys are not obstructed. The lack of intravenous contrast limits the exam.     GI/Bowel: Postsurgical changes are noted in the stomach. There is mild small  bowel dilatation along the suture line in the left upper quadrant with fecal  material.  There is a moderate amount of stool distending the rectum. Stool  is noted throughout the colon. The appendix is not identified. Pelvis: The urinary bladder is decompressed. Ascites is noted in the pelvis. Peritoneum/Retroperitoneum: An IVC filter is noted. The abdominal aorta is  of normal caliber. There is no free intraperitoneal air. Bones/Soft Tissues: There is severe 3rd spacing. An acute osseous  abnormality is not identified. IMPRESSION:  Moderate left-sided pleural effusion. Small to moderate right-sided pleural  effusion. Small pericardial effusion. Moderate amount of ascites. Moderate stool distending the rectum. Stool throughout the colon. Consider  fecal impaction and constipation. Lack of intravenous contrast limits the exam.     Severe 3rd spacing.     Assessment and Plan   Impression:) And high postvoid residual  Patient Active Problem List   Diagnosis    Cervical neuritis    Iron deficiency anemia

## 2023-08-13 LAB
ALBUMIN SERPL-MCNC: 1.7 G/DL (ref 3.5–5.2)
ALP SERPL-CCNC: 97 U/L (ref 35–104)
ALT SERPL-CCNC: 22 U/L (ref 5–33)
ANION GAP SERPL CALCULATED.3IONS-SCNC: 4 MMOL/L (ref 9–17)
AST SERPL-CCNC: 17 U/L
BILIRUB SERPL-MCNC: 0.2 MG/DL (ref 0.3–1.2)
BUN SERPL-MCNC: 14 MG/DL (ref 6–20)
BUN SERPL-MCNC: 14 MG/DL (ref 6–20)
BUN/CREAT SERPL: 28 (ref 9–20)
CALCIUM SERPL-MCNC: 6.9 MG/DL (ref 8.6–10.4)
CHLORIDE SERPL-SCNC: 110 MMOL/L (ref 98–107)
CO2 SERPL-SCNC: 25 MMOL/L (ref 20–31)
CREAT SERPL-MCNC: 0.5 MG/DL (ref 0.5–0.9)
CREAT SERPL-MCNC: 0.6 MG/DL (ref 0.5–0.9)
GFR SERPL CREATININE-BSD FRML MDRD: >60 ML/MIN/1.73M2
GFR SERPL CREATININE-BSD FRML MDRD: >60 ML/MIN/1.73M2
GLUCOSE SERPL-MCNC: 93 MG/DL (ref 70–99)
POTASSIUM SERPL-SCNC: 3.8 MMOL/L (ref 3.7–5.3)
PROT SERPL-MCNC: 3.1 G/DL (ref 6.4–8.3)
SODIUM SERPL-SCNC: 139 MMOL/L (ref 135–144)

## 2023-08-13 PROCEDURE — 2580000003 HC RX 258: Performed by: NURSE PRACTITIONER

## 2023-08-13 PROCEDURE — 6370000000 HC RX 637 (ALT 250 FOR IP): Performed by: NURSE PRACTITIONER

## 2023-08-13 PROCEDURE — 6360000002 HC RX W HCPCS: Performed by: NURSE PRACTITIONER

## 2023-08-13 PROCEDURE — 99232 SBSQ HOSP IP/OBS MODERATE 35: CPT | Performed by: NURSE PRACTITIONER

## 2023-08-13 PROCEDURE — 2060000000 HC ICU INTERMEDIATE R&B

## 2023-08-13 PROCEDURE — 84520 ASSAY OF UREA NITROGEN: CPT

## 2023-08-13 PROCEDURE — 80053 COMPREHEN METABOLIC PANEL: CPT

## 2023-08-13 PROCEDURE — 82565 ASSAY OF CREATININE: CPT

## 2023-08-13 PROCEDURE — 36415 COLL VENOUS BLD VENIPUNCTURE: CPT

## 2023-08-13 RX ORDER — CEPHALEXIN 250 MG/1
250 CAPSULE ORAL 4 TIMES DAILY
Qty: 12 CAPSULE | Refills: 0 | Status: SHIPPED | OUTPATIENT
Start: 2023-08-13 | End: 2023-08-16

## 2023-08-13 RX ADMIN — CEPHALEXIN 250 MG: 250 CAPSULE ORAL at 04:38

## 2023-08-13 RX ADMIN — PANTOPRAZOLE SODIUM 40 MG: 40 TABLET, DELAYED RELEASE ORAL at 05:01

## 2023-08-13 RX ADMIN — APIXABAN 5 MG: 5 TABLET, FILM COATED ORAL at 21:51

## 2023-08-13 RX ADMIN — SODIUM CHLORIDE, PRESERVATIVE FREE 10 ML: 5 INJECTION INTRAVENOUS at 21:51

## 2023-08-13 RX ADMIN — LEVOTHYROXINE SODIUM 25 MCG: 25 TABLET ORAL at 05:01

## 2023-08-13 RX ADMIN — APIXABAN 5 MG: 5 TABLET, FILM COATED ORAL at 09:47

## 2023-08-13 RX ADMIN — ONDANSETRON 4 MG: 2 INJECTION INTRAMUSCULAR; INTRAVENOUS at 17:00

## 2023-08-13 RX ADMIN — SODIUM CHLORIDE, PRESERVATIVE FREE 5 ML: 5 INJECTION INTRAVENOUS at 09:48

## 2023-08-13 RX ADMIN — CEPHALEXIN 250 MG: 250 CAPSULE ORAL at 12:06

## 2023-08-13 RX ADMIN — ALPRAZOLAM 1 MG: 1 TABLET ORAL at 21:56

## 2023-08-13 RX ADMIN — HYDROXYZINE HYDROCHLORIDE 100 MG: 25 TABLET, FILM COATED ORAL at 21:51

## 2023-08-13 RX ADMIN — ALPRAZOLAM 1 MG: 1 TABLET ORAL at 10:19

## 2023-08-13 RX ADMIN — BUPRENORPHINE AND NALOXONE 2 FILM: 8; 2 FILM BUCCAL; SUBLINGUAL at 16:38

## 2023-08-13 ASSESSMENT — ENCOUNTER SYMPTOMS
DIARRHEA: 0
NAUSEA: 0
CONSTIPATION: 0
VOMITING: 0
SINUS PAIN: 0
SHORTNESS OF BREATH: 1
WHEEZING: 0
COLOR CHANGE: 0
SINUS PRESSURE: 0
ABDOMINAL PAIN: 0
PHOTOPHOBIA: 0
COUGH: 0

## 2023-08-13 NOTE — PROGRESS NOTES
[facility]  Office: [address], DO, Eneida Contreras, DO, Adelina Senate, DO, Waqar Graham Blood, DO, Janelle Mcrae MD, Tati Begum MD, Katelyn Mendosa MD, Colonel Austin MD,  J Luis Borges MD, Shai Hutchison MD, Vishal Grant, DO, Aniket Pereira MD,  Kathrine Klinefelter, MD, Allison Cherry MD, Jacqueline Early, DO, Peter Wilson MD,  Anupama Skinner, DO, Bob Hancock MD, Waleska Frances MD, Merissa Cox MD, Flaquito Cardoza MD,  Emigdio Servin MD, Jo Aguilar MD, Emely Marroquin MD, Karoline Wilcox DO, Barbie Jeter MD,  Enoch Prado MD, Vikash Awan, CNP,  Seamus Ordaz, CNP, Ena Ziegler, CNP, Nas Forbes, CNP,  Roosevelt Calhoun, [facility], Valentina Pineda, CNP, Cordelia Melchor, CNP, Chung Arguello, CNP, Veronica Dwyer, CNP, Charity Gerber, CNP, YOLANDA LoomisC, Kadi Pitts, CNS, Ashvin Layton, [facility], [facility], [facility]    Progress Note    8/13/2023    10:41 AM    Name:   Keyona Darling  MRN:     [id number]     Acct:      [de-identified]   Room:   [address] [place] [address] Day:  3  Admit Date:  8/10/2023  1:01 AM    PCP:   KETTY Holbrook NP  Code Status:  Full Code    Subjective:     C/C:   Chief Complaint   Patient presents with    Swelling     Here recently and sent home with water pills, but out of them and swelling is worsening     Interval History Status: improved. Patient's third spacing is most likely from severe protein deficiency from malnutrition from her gastric bypass and presumed short gut syndrome. Plan for iron replacement, protein calorie supplements, diet advancement. Will likely require SNF to address her severe weakness and fatigue secondary to severe protein malnutrition. Improving and medically stable for discharge. Case is discussed with gastroenterology and general surgery.   Neither service has acute input and both have recommended that she follow with a

## 2023-08-13 NOTE — PLAN OF CARE
Pt alert and oriented. VSS. NSR on tele. SpO2 high 90s on RA. Afebrile. No c/o pain. Pt receiving po abx for UTI. Zuñiga in place; 450mL mary output. PRN zofran given for nausea. Will d/c home once medically stable. Bed alarm on, call light within reach. Will monitor. Problem: Discharge Planning  Goal: Discharge to home or other facility with appropriate resources  Outcome: Progressing     Problem: Respiratory - Adult  Goal: Achieves optimal ventilation and oxygenation  Outcome: Progressing     Problem: Safety - Adult  Goal: Free from fall injury  Outcome: Progressing     Problem: ABCDS Injury Assessment  Goal: Absence of physical injury  Outcome: Progressing     Problem: Pain  Goal: Verbalizes/displays adequate comfort level or baseline comfort level  Outcome: Progressing     Problem: Musculoskeletal - Adult  Goal: Return mobility to safest level of function  Outcome: Progressing  Goal: Return ADL status to a safe level of function  Outcome: Progressing     Problem: Skin/Tissue Integrity  Goal: Absence of new skin breakdown  Description: 1. Monitor for areas of redness and/or skin breakdown  2. Assess vascular access sites hourly  3. Every 4-6 hours minimum:  Change oxygen saturation probe site  4. Every 4-6 hours:  If on nasal continuous positive airway pressure, respiratory therapy assess nares and determine need for appliance change or resting period.   Outcome: Progressing     Problem: Nutrition Deficit:  Goal: Optimize nutritional status  Outcome: Progressing

## 2023-08-13 NOTE — PLAN OF CARE
Pt alert and oriented. Pt hyperverbal at times, and anxious at times. Pt awakens from sleep stating she needs xanax. Pt assisted with 1 assist and walker to bathroom. When pt finished, took 2 assist to get pt off commode, then pt attempts to move quickly back to bed. Attempt to educate on safety and encourage pt to use walker to avoid fall. Pt states if she doesn't go quickly she will fall. Pt safely back in bed moaning. Zofran given for c/o nausea. Pt refused antibiotic due to feeling too sick. Safety precautions remain in place.        Problem: Respiratory - Adult  Goal: Achieves optimal ventilation and oxygenation  8/13/2023 1514 by Lo Naik RN  Outcome: Progressing     Problem: Safety - Adult  Goal: Free from fall injury  8/13/2023 1514 by Lo Naik RN  Outcome: Progressing     Problem: Pain  Goal: Verbalizes/displays adequate comfort level or baseline comfort level  8/13/2023 1514 by Lo Naik RN  Outcome: Progressing

## 2023-08-14 ENCOUNTER — APPOINTMENT (OUTPATIENT)
Dept: GENERAL RADIOLOGY | Age: 43
DRG: 421 | End: 2023-08-14
Payer: MEDICAID

## 2023-08-14 LAB
ALBUMIN SERPL-MCNC: 1.8 G/DL (ref 3.5–5.2)
ALP SERPL-CCNC: 99 U/L (ref 35–104)
ALT SERPL-CCNC: 21 U/L (ref 5–33)
ANION GAP SERPL CALCULATED.3IONS-SCNC: 5 MMOL/L (ref 9–17)
AST SERPL-CCNC: 15 U/L
BASOPHILS # BLD: 0.09 K/UL (ref 0–0.2)
BASOPHILS NFR BLD: 1 %
BILIRUB SERPL-MCNC: 0.2 MG/DL (ref 0.3–1.2)
BUN SERPL-MCNC: 14 MG/DL (ref 6–20)
BUN/CREAT SERPL: 28 (ref 9–20)
CALCIUM SERPL-MCNC: 7 MG/DL (ref 8.6–10.4)
CHLORIDE SERPL-SCNC: 107 MMOL/L (ref 98–107)
CO2 SERPL-SCNC: 25 MMOL/L (ref 20–31)
CREAT SERPL-MCNC: 0.5 MG/DL (ref 0.5–0.9)
DATE, STOOL #1: ABNORMAL
EOSINOPHIL # BLD: 0 K/UL (ref 0–0.4)
EOSINOPHILS RELATIVE PERCENT: 0 % (ref 1–4)
ERYTHROCYTE [DISTWIDTH] IN BLOOD BY AUTOMATED COUNT: 25.2 % (ref 11.8–14.4)
GFR SERPL CREATININE-BSD FRML MDRD: >60 ML/MIN/1.73M2
GLUCOSE SERPL-MCNC: 87 MG/DL (ref 70–99)
HCT VFR BLD AUTO: 28.5 % (ref 36.3–47.1)
HEMOCCULT SP1 STL QL: POSITIVE
HGB BLD-MCNC: 8.3 G/DL (ref 11.9–15.1)
IMM GRANULOCYTES # BLD AUTO: 0 K/UL (ref 0–0.3)
IMM GRANULOCYTES NFR BLD: 0 %
LYMPHOCYTES NFR BLD: 2.48 K/UL (ref 1–4.8)
LYMPHOCYTES RELATIVE PERCENT: 27 % (ref 24–44)
MCH RBC QN AUTO: 24.2 PG (ref 25.2–33.5)
MCHC RBC AUTO-ENTMCNC: 29.1 G/DL (ref 28.4–34.8)
MCV RBC AUTO: 83.1 FL (ref 82.6–102.9)
MONOCYTES NFR BLD: 0.74 K/UL (ref 0.2–0.8)
MONOCYTES NFR BLD: 8 % (ref 1–7)
MORPHOLOGY: ABNORMAL
NEUTROPHILS NFR BLD: 64 % (ref 36–66)
NEUTS SEG NFR BLD: 5.89 K/UL (ref 1.8–7.7)
NRBC BLD-RTO: 0 PER 100 WBC
PLATELET # BLD AUTO: 283 K/UL (ref 138–453)
PMV BLD AUTO: 8.9 FL (ref 8.1–13.5)
POTASSIUM SERPL-SCNC: 4 MMOL/L (ref 3.7–5.3)
PROT SERPL-MCNC: 3.2 G/DL (ref 6.4–8.3)
RBC # BLD AUTO: 3.43 M/UL (ref 3.95–5.11)
SODIUM SERPL-SCNC: 137 MMOL/L (ref 135–144)
TIME, STOOL #1: 2055
WBC OTHER # BLD: 9.2 K/UL (ref 3.5–11.3)

## 2023-08-14 PROCEDURE — 2580000003 HC RX 258: Performed by: NURSE PRACTITIONER

## 2023-08-14 PROCEDURE — 6370000000 HC RX 637 (ALT 250 FOR IP): Performed by: NURSE PRACTITIONER

## 2023-08-14 PROCEDURE — 2060000000 HC ICU INTERMEDIATE R&B

## 2023-08-14 PROCEDURE — 85025 COMPLETE CBC W/AUTO DIFF WBC: CPT

## 2023-08-14 PROCEDURE — 80053 COMPREHEN METABOLIC PANEL: CPT

## 2023-08-14 PROCEDURE — 6360000002 HC RX W HCPCS: Performed by: NURSE PRACTITIONER

## 2023-08-14 PROCEDURE — 36415 COLL VENOUS BLD VENIPUNCTURE: CPT

## 2023-08-14 PROCEDURE — 82272 OCCULT BLD FECES 1-3 TESTS: CPT

## 2023-08-14 PROCEDURE — 71045 X-RAY EXAM CHEST 1 VIEW: CPT

## 2023-08-14 PROCEDURE — P9047 ALBUMIN (HUMAN), 25%, 50ML: HCPCS | Performed by: NURSE PRACTITIONER

## 2023-08-14 PROCEDURE — 99232 SBSQ HOSP IP/OBS MODERATE 35: CPT | Performed by: NURSE PRACTITIONER

## 2023-08-14 RX ORDER — ALBUMIN (HUMAN) 12.5 G/50ML
50 SOLUTION INTRAVENOUS EVERY 8 HOURS
Status: COMPLETED | OUTPATIENT
Start: 2023-08-14 | End: 2023-08-14

## 2023-08-14 RX ORDER — FUROSEMIDE 10 MG/ML
40 INJECTION INTRAMUSCULAR; INTRAVENOUS ONCE
Status: COMPLETED | OUTPATIENT
Start: 2023-08-14 | End: 2023-08-14

## 2023-08-14 RX ADMIN — CEPHALEXIN 250 MG: 250 CAPSULE ORAL at 17:55

## 2023-08-14 RX ADMIN — FUROSEMIDE 40 MG: 10 INJECTION, SOLUTION INTRAMUSCULAR; INTRAVENOUS at 19:19

## 2023-08-14 RX ADMIN — BUPRENORPHINE AND NALOXONE 2 FILM: 8; 2 FILM BUCCAL; SUBLINGUAL at 17:55

## 2023-08-14 RX ADMIN — ALPRAZOLAM 1 MG: 1 TABLET ORAL at 09:53

## 2023-08-14 RX ADMIN — SODIUM CHLORIDE, PRESERVATIVE FREE 10 ML: 5 INJECTION INTRAVENOUS at 21:26

## 2023-08-14 RX ADMIN — HYDROXYZINE HYDROCHLORIDE 100 MG: 25 TABLET, FILM COATED ORAL at 21:25

## 2023-08-14 RX ADMIN — LEVOTHYROXINE SODIUM 25 MCG: 25 TABLET ORAL at 05:17

## 2023-08-14 RX ADMIN — ALBUMIN (HUMAN) 50 G: 0.25 INJECTION, SOLUTION INTRAVENOUS at 12:36

## 2023-08-14 RX ADMIN — APIXABAN 5 MG: 5 TABLET, FILM COATED ORAL at 21:25

## 2023-08-14 RX ADMIN — SODIUM CHLORIDE, PRESERVATIVE FREE 10 ML: 5 INJECTION INTRAVENOUS at 09:55

## 2023-08-14 RX ADMIN — PANTOPRAZOLE SODIUM 40 MG: 40 TABLET, DELAYED RELEASE ORAL at 05:17

## 2023-08-14 RX ADMIN — FUROSEMIDE 40 MG: 10 INJECTION, SOLUTION INTRAMUSCULAR; INTRAVENOUS at 12:34

## 2023-08-14 RX ADMIN — ALPRAZOLAM 1 MG: 1 TABLET ORAL at 21:37

## 2023-08-14 RX ADMIN — CEPHALEXIN 250 MG: 250 CAPSULE ORAL at 00:26

## 2023-08-14 RX ADMIN — ALBUMIN (HUMAN) 50 G: 0.25 INJECTION, SOLUTION INTRAVENOUS at 17:56

## 2023-08-14 RX ADMIN — CEPHALEXIN 250 MG: 250 CAPSULE ORAL at 23:34

## 2023-08-14 RX ADMIN — CEPHALEXIN 250 MG: 250 CAPSULE ORAL at 12:34

## 2023-08-14 RX ADMIN — APIXABAN 5 MG: 5 TABLET, FILM COATED ORAL at 09:53

## 2023-08-14 RX ADMIN — CEPHALEXIN 250 MG: 250 CAPSULE ORAL at 05:16

## 2023-08-14 ASSESSMENT — ENCOUNTER SYMPTOMS
COLOR CHANGE: 0
NAUSEA: 0
COUGH: 0
SINUS PAIN: 0
WHEEZING: 0
CONSTIPATION: 0
DIARRHEA: 0
ABDOMINAL PAIN: 0
SHORTNESS OF BREATH: 1
PHOTOPHOBIA: 0
VOMITING: 0
SINUS PRESSURE: 0

## 2023-08-14 NOTE — PROGRESS NOTES
Occupational Therapy  DATE: 2023    NAME: Baron Russell  MRN: [id number]   : 1980    Patient not seen this date for Occupational Therapy due to:      [] Cancel by RN or physician due to:    [] Hemodialysis    [] Critical Lab Value Level     [] Blood transfusion in progress    [] Acute or unstable cardiovascular status   _MAP < 55 or more than >115  _HR < 40 or > 130    [] Acute or unstable pulmonary status   -FiO2 > 60%   _RR < 5 or >40    _O2 sats < 85%    [] Strict Bedrest    [] Off Unit for surgery or procedure    [] Off Unit for testing       [] Pending imaging to R/O fracture    [x] Refusal by Patient  (Second attempt, pt refused d/t not feeling well. Pt reported \"I am nauseous and not feeling good, can we try tomorrow?) OT to follow. [] Intubated    [] Other      [] OT being discontinued at this time. Patient independent. No further needs. [] OT being discontinued at this time as the patient has been transferred to hospice care. No further needs.       Patsy Camacho, OTR/L

## 2023-08-14 NOTE — PLAN OF CARE
Pt alert and oriented. VSS. NSR on tele. SpO2 mid 90s on RA. Afebrile. No c/o pain. PRN xanax given for anxiety. Pt receiving po abx. Zuñiga in place and patent; 550mL mary output. Will need void trial before d/c. Will need SNF at d/c. Bed alarm on, call light within reach. Will monitor. Problem: Discharge Planning  Goal: Discharge to home or other facility with appropriate resources  Outcome: Progressing     Problem: Respiratory - Adult  Goal: Achieves optimal ventilation and oxygenation  Outcome: Progressing     Problem: Safety - Adult  Goal: Free from fall injury  Outcome: Progressing     Problem: ABCDS Injury Assessment  Goal: Absence of physical injury  Outcome: Progressing     Problem: Pain  Goal: Verbalizes/displays adequate comfort level or baseline comfort level  Outcome: Progressing     Problem: Musculoskeletal - Adult  Goal: Return mobility to safest level of function  Outcome: Progressing  Goal: Return ADL status to a safe level of function  Outcome: Progressing     Problem: Skin/Tissue Integrity  Goal: Absence of new skin breakdown  Description: 1. Monitor for areas of redness and/or skin breakdown  2. Assess vascular access sites hourly  3. Every 4-6 hours minimum:  Change oxygen saturation probe site  4. Every 4-6 hours:  If on nasal continuous positive airway pressure, respiratory therapy assess nares and determine need for appliance change or resting period.   Outcome: Progressing     Problem: Nutrition Deficit:  Goal: Optimize nutritional status  Outcome: Progressing

## 2023-08-14 NOTE — PROGRESS NOTES
Occupational Therapy  DATE: 2023    NAME: Baron Russell  MRN: [id number]   : 1980    Patient not seen this date for Occupational Therapy due to:      [] Cancel by RN or physician due to:    [] Hemodialysis    [] Critical Lab Value Level     [] Blood transfusion in progress    [] Acute or unstable cardiovascular status   _MAP < 55 or more than >115  _HR < 40 or > 130    [] Acute or unstable pulmonary status   -FiO2 > 60%   _RR < 5 or >40    _O2 sats < 85%    [] Strict Bedrest    [] Off Unit for surgery or procedure    [] Off Unit for testing       [] Pending imaging to R/O fracture    [x] Refusal by Patient  (Pt reported not feeling well this morning and reports \"this is the most tired and weak i've been\". Pt requesting OT check back later. OT to follow)     [] Intubated    [] Other      [] OT being discontinued at this time. Patient independent. No further needs. [] OT being discontinued at this time as the patient has been transferred to hospice care. No further needs.       Patsy Camacho, OTR/L

## 2023-08-14 NOTE — PROGRESS NOTES
is soft. Tenderness: There is abdominal tenderness. Musculoskeletal:      Right lower leg: Edema present. Left lower leg: Edema present. Skin:     General: Skin is warm. Coloration: Skin is pale. Neurological:      General: No focal deficit present. Mental Status: She is alert and oriented to person, place, and time. Assessment:        Hospital Problems             Last Modified POA    * (Principal) Pericardial effusion 8/10/2023 Yes    Iron deficiency anemia 8/10/2023 Yes    Pulmonary embolism on right ([address]) 8/10/2023 Yes    Tobacco use 8/10/2023 Yes    Bilateral leg edema 8/10/2023 Yes    Anxiety 8/10/2023 Yes    History of gastric bypass 8/10/2023 Yes    Abdominal ascites 8/10/2023 Yes    Anasarca 8/10/2023 Yes    History of intravenous drug use in remission 8/10/2023 Yes    Acute cystitis without hematuria 8/10/2023 Yes    At risk for fluid volume overload 8/10/2023 Yes    Protein deficiency ([address]) 8/10/2023 Yes    Severe protein-calorie malnutrition ([address]) 8/10/2023 Yes   Plan:        Fluid volume overload with new onset of pericardial effusion, anasarca, abdominal ascites, and gross third spacing with significant bilateral lower extremity edema of unknown/uncertain etiology  Significant weight gain from fluid resuscitation, plan for additional albumin and diuretic initiation today  Hemoglobin trending down, continue to watch closely  Severe malnutrition as evidenced by third spacing, albumin of 1.9, protein of 3.8, iron deficiency anemia, history of short gut syndrome.   IV Venofer, may require PRBC transfusion if trend continues down  Dietary consultation  Will need to follow-up outpatient with bariatric center  Acute cystitis without hematuria with concern for sepsis of unknown etiology  Narrow antibiotic coverage for UTI and discontinue broad-spectrum  Complete Keflex course  Pulmonary embolism on right with exertional dyspnea on long-term anticoagulation resulting in secondary hypercoagulable state  Hold Eliquis for now due to downtrending hemoglobin  Hem stable, restart  Check occult stool, no clear evidence of GI bleed or retroperitoneal hemorrhage    KETTY Cadet - NP  8/14/2023  8:56 AM

## 2023-08-14 NOTE — PROGRESS NOTES
Physical Therapy  DATE: 2023    NAME: Steve Schilling  MRN: [id number]   : 1980    Patient not seen this date for Physical Therapy due to:      [] Cancel by RN or physician due to:    [] Hemodialysis    [] Critical Lab Value Level     [] Blood transfusion in progress    [] Acute or unstable cardiovascular status   _MAP < 55 or more than >115  _HR < 40 or > 130    [] Acute or unstable pulmonary status   -FiO2 > 60%   _RR < 5 or >40    _O2 sats < 85%    [] Strict Bedrest    [] Off Unit for surgery or procedure    [] Off Unit for testing       [] Pending imaging to R/O fracture    [x] Refusal by Patient (Attempted to work with patient x 2. Both time patient reports she is too tired and nauseous. Not agreeable even with encouragement.)     [] Other      [] PT being discontinued at this time. Patient independent. No further needs. [] PT being discontinued at this time as the patient has been transferred to hospice care. No further needs.       Vu Mcgee, PTA

## 2023-08-14 NOTE — PLAN OF CARE
Pt is currently in bed with complaints of anxiety and chest tightness. Xanax provided relief. Albumin has been replaced and IV lasix has been given. See MAR. Pt vitals have remained WNL and she is tolerating IV lasix. Pt refused PT/OT today and needs further encouragement. Zuñiga care completed and still patent. Adequate output throughout the shift. Pt still retaining fluid d/t third spacing. Pt is to be discharged to a SNF. Case management has talked with her and gave her a list of SNF's. Standard safety measures in place, bed in locked and lowest position, will continue to provide support and monitor.      Problem: Discharge Planning  Goal: Discharge to home or other facility with appropriate resources  8/14/2023 1936 by Garland Bonner RN  Outcome: Progressing     Problem: Respiratory - Adult  Goal: Achieves optimal ventilation and oxygenation  8/14/2023 1936 by Garland Bonner RN  Outcome: Progressing     Problem: Safety - Adult  Goal: Free from fall injury  8/14/2023 1936 by Garland Bonner RN  Outcome: Progressing     Problem: ABCDS Injury Assessment  Goal: Absence of physical injury  8/14/2023 1936 by Garland Bonner RN  Outcome: Progressing     Problem: Musculoskeletal - Adult  Goal: Return mobility to safest level of function  8/14/2023 1936 by Garland Bonner RN  Outcome: Progressing

## 2023-08-14 NOTE — CARE COORDINATION
Social work: Referrals to [employer] and [employer]. 3rd choice is [facility].

## 2023-08-15 LAB
BUN SERPL-MCNC: 12 MG/DL (ref 6–20)
CREAT SERPL-MCNC: 0.5 MG/DL (ref 0.5–0.9)
GFR SERPL CREATININE-BSD FRML MDRD: >60 ML/MIN/1.73M2
MICROORGANISM SPEC CULT: NORMAL
MICROORGANISM SPEC CULT: NORMAL
SERVICE CMNT-IMP: NORMAL
SERVICE CMNT-IMP: NORMAL
SPECIMEN DESCRIPTION: NORMAL
SPECIMEN DESCRIPTION: NORMAL

## 2023-08-15 PROCEDURE — 2060000000 HC ICU INTERMEDIATE R&B

## 2023-08-15 PROCEDURE — 84520 ASSAY OF UREA NITROGEN: CPT

## 2023-08-15 PROCEDURE — 36415 COLL VENOUS BLD VENIPUNCTURE: CPT

## 2023-08-15 PROCEDURE — 6360000002 HC RX W HCPCS: Performed by: NURSE PRACTITIONER

## 2023-08-15 PROCEDURE — 97535 SELF CARE MNGMENT TRAINING: CPT

## 2023-08-15 PROCEDURE — 2580000003 HC RX 258: Performed by: NURSE PRACTITIONER

## 2023-08-15 PROCEDURE — 97166 OT EVAL MOD COMPLEX 45 MIN: CPT

## 2023-08-15 PROCEDURE — 6370000000 HC RX 637 (ALT 250 FOR IP): Performed by: NURSE PRACTITIONER

## 2023-08-15 PROCEDURE — 51702 INSERT TEMP BLADDER CATH: CPT

## 2023-08-15 PROCEDURE — 99232 SBSQ HOSP IP/OBS MODERATE 35: CPT | Performed by: INTERNAL MEDICINE

## 2023-08-15 PROCEDURE — 82565 ASSAY OF CREATININE: CPT

## 2023-08-15 PROCEDURE — 97530 THERAPEUTIC ACTIVITIES: CPT

## 2023-08-15 RX ORDER — FUROSEMIDE 40 MG/1
40 TABLET ORAL DAILY
Qty: 60 TABLET | Refills: 3 | DISCHARGE
Start: 2023-08-15

## 2023-08-15 RX ADMIN — FUROSEMIDE 40 MG: 10 INJECTION, SOLUTION INTRAMUSCULAR; INTRAVENOUS at 17:52

## 2023-08-15 RX ADMIN — LEVOTHYROXINE SODIUM 25 MCG: 25 TABLET ORAL at 05:06

## 2023-08-15 RX ADMIN — FUROSEMIDE 40 MG: 10 INJECTION, SOLUTION INTRAMUSCULAR; INTRAVENOUS at 07:43

## 2023-08-15 RX ADMIN — CEPHALEXIN 250 MG: 250 CAPSULE ORAL at 12:36

## 2023-08-15 RX ADMIN — CEPHALEXIN 250 MG: 250 CAPSULE ORAL at 05:06

## 2023-08-15 RX ADMIN — APIXABAN 5 MG: 5 TABLET, FILM COATED ORAL at 21:40

## 2023-08-15 RX ADMIN — SODIUM CHLORIDE, PRESERVATIVE FREE 10 ML: 5 INJECTION INTRAVENOUS at 21:40

## 2023-08-15 RX ADMIN — PANTOPRAZOLE SODIUM 40 MG: 40 TABLET, DELAYED RELEASE ORAL at 05:06

## 2023-08-15 RX ADMIN — ALPRAZOLAM 1 MG: 1 TABLET ORAL at 07:43

## 2023-08-15 RX ADMIN — SODIUM CHLORIDE, PRESERVATIVE FREE 10 ML: 5 INJECTION INTRAVENOUS at 07:43

## 2023-08-15 RX ADMIN — BUPRENORPHINE AND NALOXONE 2 FILM: 8; 2 FILM BUCCAL; SUBLINGUAL at 16:48

## 2023-08-15 RX ADMIN — ALPRAZOLAM 1 MG: 1 TABLET ORAL at 17:56

## 2023-08-15 RX ADMIN — APIXABAN 5 MG: 5 TABLET, FILM COATED ORAL at 07:43

## 2023-08-15 RX ADMIN — CEPHALEXIN 250 MG: 250 CAPSULE ORAL at 23:55

## 2023-08-15 RX ADMIN — CEPHALEXIN 250 MG: 250 CAPSULE ORAL at 17:52

## 2023-08-15 ASSESSMENT — PAIN SCALES - GENERAL: PAINLEVEL_OUTOF10: 3

## 2023-08-15 NOTE — DISCHARGE SUMMARY
[facility]  Office: [address], DO, Zoey Weston, DO, Walnut Me, DO, Oralee Fondjoselyn Blood, DO, Lyla Jordan MD, Jeb Abernathy MD, Aggie Flores MD, Juaquin Rogers MD,  Jenny Duvall MD, Isa Garcia MD, Candace Peraza, DO, Judy Jackson MD,  Peggy Heard, DO, Juan Jha MD, Anne Bronson MD, Kirsten West DO, Avery Palm MD,  Cornelia Mccullough, DO, Darell Polanco MD, Paul Jauregui MD, Clare Chacko MD, Rio Hwang MD,  Aline Avery MD, Itz Herrera MD, Maxine Ponce MD, Alexy Blanchard, DO, Sharmaine Valdez MD,  Anuja Skinner MD, Kristen Parikh, CNP,  Joy Chacon, CNP, Shawnee Rogel, CNP, Sunil Rose, CNP,  Frederick Patel, [facility], Eduardo Streeter, CNP, Raulito Maravilla, CNP, Lissa Espinoza, CNP, Ti Orlando, CNP, Mia Drain, CNP, Angela Partida PA-C, Angela Sánchez, MARIAM, Charlotte Cobos, CNP, William Vegas, [facility]    Discharge Summary     Patient ID: Jose Bradshaw  :  1980   MRN: [id number]     ACCOUNT:  [de-identified]   Patient's PCP: KETTY Pimentel NP  Admit Date: 8/10/2023   Discharge Date: 8/15/2023     Length of Stay: 5  Code Status:  Full Code  Admitting Physician: Zaynab Walker DO  Discharge Physician: Nicole Molina DO     Active Discharge Diagnoses:     Hospital Problem Lists:  Principal Problem:    Pericardial effusion  Active Problems:    Iron deficiency anemia    Pulmonary embolism on right [facility])    Tobacco use    Bilateral leg edema    Anxiety    History of gastric bypass    Abdominal ascites    Anasarca    History of intravenous drug use in remission    Acute cystitis without hematuria    At risk for fluid volume overload    Protein deficiency (HCC)    Severe protein-calorie malnutrition ([address])  Resolved Problems:    * No resolved hospital problems.  *      Admission Condition:  fair     Discharged Condition: stable    Hospital Stay: Hospital Course:  Ba Medina is a 43 y.o. female who was admitted for the management of  Pericardial effusion , presented to ER with Swelling (Here recently and sent home with water pills, but out of them and swelling is worsening)    This is a 77-year-old female that presents with a complaint of increasing edema abdominal girth. She is found to have anasarca with fluid volume overload was treated with albumin and IV Lasix with improvement in her symptoms. She was also found to have urinary tract infection initially treated with IV antibiotics and transition to Keflex based on cultures. Ultimately her edema is improving and she was transitioned to oral Lasix was able to be discharged to skilled facility in stable condition. Significant therapeutic interventions: As above    Significant Diagnostic Studies:   Labs / Micro:  CBC:   Lab Results   Component Value Date/Time    WBC 9.2 08/14/2023 08:55 AM    RBC 3.43 08/14/2023 08:55 AM    HGB 8.3 08/14/2023 08:55 AM    HCT 28.5 08/14/2023 08:55 AM    MCV 83.1 08/14/2023 08:55 AM    MCH 24.2 08/14/2023 08:55 AM    MCHC 29.1 08/14/2023 08:55 AM    RDW 25.2 08/14/2023 08:55 AM     08/14/2023 08:55 AM     BMP:    Lab Results   Component Value Date/Time    GLUCOSE 87 08/14/2023 08:55 AM     08/14/2023 08:55 AM    K 4.0 08/14/2023 08:55 AM     08/14/2023 08:55 AM    CO2 25 08/14/2023 08:55 AM    ANIONGAP 5 08/14/2023 08:55 AM    BUN 12 08/15/2023 05:22 AM    CREATININE 0.5 08/15/2023 05:22 AM    BUNCRER 28 08/14/2023 08:55 AM    CALCIUM 7.0 08/14/2023 08:55 AM    LABGLOM >60 08/15/2023 05:22 AM    GFRAA >60 06/10/2022 03:25 PM    GFR      06/10/2022 03:25 PM        Radiology:  CT ABDOMEN PELVIS WO CONTRAST Additional Contrast? None    Result Date: 8/11/2023  Moderate left-sided pleural effusion. Small to moderate right-sided pleural effusion. Small pericardial effusion. Moderate amount of ascites. Moderate stool distending the rectum.

## 2023-08-15 NOTE — PROGRESS NOTES
Physical Therapy  Facility/Department: [facility] PROGRESSIVE CARE  Daily Treatment Note  NAME: Becca Zabala  : 1980  MRN: [id number]    Date of Service: 8/15/2023    Discharge Recommendations:  Patient would benefit from continued therapy after discharge    Pt currently functioning below baseline. Recommend daily inpatient skilled therapy at time of discharge to maximize long term outcomes and prevent re-admission. Please refer to AM-PAC score for current level of function. Patient Diagnosis(es): The primary encounter diagnosis was Pericardial effusion. Diagnoses of Other ascites, Dyspnea and respiratory abnormalities, and Pleural effusion were also pertinent to this visit. Assessment   Assessment: Patient progressing toward STGs but limited by decreased activity tolerance affecting her transfers and safety as she fatigues very quickly with minimal activity. Patient is currently a high fall risk and would benefit from continued skilled PT services. Activity Tolerance: Treatment limited secondary to medical complications; Patient limited by endurance; Patient limited by fatigue   AM-PAC Score: 14    Plan    Physcial Therapy Plan  General Plan: 5-7 times per week  Current Treatment Recommendations: Strengthening;ROM;Balance training;Functional mobility training;Transfer training;Gait training;Patient/Caregiver education & training;Positioning; Safety education & training; Endurance training; Therapeutic activities; Home exercise program     Restrictions  Restrictions/Precautions  Restrictions/Precautions: General Precautions, Fall Risk  Required Braces or Orthoses?: No  Position Activity Restriction  Other position/activity restrictions: Up w/ assist, telemetry, Zuñiga catheter, RUE IV, maintain heels off bed     Subjective    Subjective  Subjective: Patient agreeable for PT treatment.   Orientation  Overall Orientation Status: Within Functional Limits     Objective   Bed Mobility Training  Bed Mobility

## 2023-08-15 NOTE — PROGRESS NOTES
Occupational Therapy  Facility/Department: [facility] PROGRESSIVE CARE  Occupational Therapy Initial Assessment    Name: Delbert Orta  : 1980  MRN: [id number]  Date of Service: 8/15/2023    ANSON Earl reports patient is medically stable for therapy treatment this date. Chart reviewed prior to treatment and patient is agreeable for therapy. All lines intact and patient positioned comfortably at end of treatment. All patient needs addressed prior to ending therapy session. Pt currently functioning below baseline. Recommend daily inpatient skilled therapy at time of discharge to maximize long term outcomes and prevent re-admission. Please refer to AM-PAC score for current level of function. Discharge Recommendations:  Patient would benefit from continued therapy after discharge  OT Equipment Recommendations  Equipment Needed: Yes (CTA)  Mobility Devices: ADL Assistive Devices  ADL Assistive Devices: Reacher;Sock-Aid Hard;Long-handled Sponge;Long-handled Shoe Horn       Per H&P: Delbert Orta is a 43 y.o. Non- / non  female who presents with Swelling (Here recently and sent home with water pills, but out of them and swelling is worsening) and is admitted to the hospital for the management of Pericardial effusion. Patient reports to the emergency department with diffuse bilateral lower extremity swelling, anasarca, and ascites. She reports that this has been waxing and waning in intensity for the past 1 year but over the past 3 days it has dramatically increased. She was seen and evaluated approximately 2 weeks ago where she was found to have a pulmonary emboli and was started on Eliquis. At that time she was having what she described as moderate lower extremity edema and was started on diuretics. Patient did not follow-up and ran out of the hospital supplied prescription for Lasix.   She reports since that time she had a dramatic increase in her edema to the point she can no longer

## 2023-08-15 NOTE — PROGRESS NOTES
Pt up to bathroom and had large brown bowel movement, pt walked to bathroom with minimal assist but had trouble standing up after bowel movement and complained that she felt very weak and very anxious, pt assisted back to bed and vitals signs are stable, bed alarm on, will continue to monitor

## 2023-08-15 NOTE — PROGRESS NOTES
[facility]  Office: [address], DO, Roman Dys, DO, Temitope Finely, DO, Sandra Pastures Blood, DO, Carmen Ríos MD, Ebony Colmenares MD, Max Narvaez MD, Allie Pike MD,  Jorgito Dominguez MD, Vicente Jason MD, Misty Bell, DO, Smooth Rueda MD,  Scott Leiva MD, Francoise Mancia MD, Carlita Fajardo, DO, Eduardo Lua MD,  Chris Moreno, DO, Samantha Back MD, Bogdan Silveira MD, Alona Fallon MD, Patrick March MD,  Senia Dubon MD, Brenda Cash MD, Jose Car MD, Mark Oseguera DO, Martín Hester MD,  Silvio Romero MD, Hang Blanchard, CNP,  Lennox Mcclure, CNP, Wilson Madera, CNP, Darell Wilson, CNP,  Anali Sloan, [facility], Masha Christensen, CNP, Monica Edwards, CNP, Da Rice, CNP, Ritika Herring, CNP, Eunice Mathis, CNP, Rosario Banda, PA-C, Jamal Aguilar, CNS, Gil Candelaria, CNP, Andrew Lynn, [facility]    Progress Note    8/15/2023    8:56 AM    Name:   Ermelinda Rueda  MRN:     [id number]     Acct:      [de-identified]   Room:   [address] [place] [address] Day:  5  Admit Date:  8/10/2023  1:01 AM    PCP:   KETTY Zayas NP  Code Status:  Full Code    Subjective:     C/C:   Chief Complaint   Patient presents with    Swelling     Here recently and sent home with water pills, but out of them and swelling is worsening     Interval History Status: improved. Patient with decreasing edema. Denies any complaints of chest pain, shortness of breath, nausea vomiting, fevers or chills. Continues to have physical weakness. Brief History:     Per prior documentation  \"8/10 - Patient reports to the emergency department with diffuse bilateral lower extremity swelling, anasarca, and ascites. She reports that this has been waxing and waning in intensity for the past 1 year but over the past 3 days it has dramatically increased.   She was seen and evaluated approximately 2 weeks ago The patient stopped by and stated her PT is recommending cyclobenzaprine. She would to know if Dr Henry is ok with this treatment.     She is also requesting a referral to Oral surgery as another one of her teeth broke.

## 2023-08-15 NOTE — PLAN OF CARE
Pt resting comfortably in her chair throughout the day. Xanax given 2 times this shift for anxiety. Pt remains free from falls. Vitals stable. Family at bedside this evening. Pt did go outside to smoke. IV lasix given. Zuñiga still in place. All questions and concerns address. Bed is locked and In the lowest position.     Problem: Discharge Planning  Goal: Discharge to home or other facility with appropriate resources  Outcome: Progressing  Flowsheets (Taken 8/15/2023 0752)  Discharge to home or other facility with appropriate resources: Identify barriers to discharge with patient and caregiver     Problem: Respiratory - Adult  Goal: Achieves optimal ventilation and oxygenation  Outcome: Progressing  Flowsheets (Taken 8/15/2023 0752)  Achieves optimal ventilation and oxygenation: Assess for changes in respiratory status     Problem: Safety - Adult  Goal: Free from fall injury  Outcome: Progressing     Problem: ABCDS Injury Assessment  Goal: Absence of physical injury  Outcome: Progressing

## 2023-08-15 NOTE — PLAN OF CARE
Pt had restful night, pt admitted for pericardial effusion, pt continues on IV lasix and albumin and is diuresing well, yadav catheter remains in place, po keflex for uti, pt has precert pending for [facility], vss, stool sent to lab for occult blood tonight, other treatments continue     Problem: Safety - Adult  Goal: Free from fall injury  8/15/2023 0428 by Annabel Montez RN  Outcome: Progressing     Patient has remained free from falls this shift. Patient is alert and oriented times four. Bed to lowest position with door open. Patient care items and call light in reach. Patient uses call light appropriately for assist. Will continue to monitor. Please see fall assessment.

## 2023-08-16 ENCOUNTER — APPOINTMENT (OUTPATIENT)
Dept: MRI IMAGING | Age: 43
DRG: 421 | End: 2023-08-16
Payer: MEDICAID

## 2023-08-16 VITALS
DIASTOLIC BLOOD PRESSURE: 63 MMHG | BODY MASS INDEX: 29.03 KG/M2 | WEIGHT: 185 LBS | TEMPERATURE: 98.6 F | SYSTOLIC BLOOD PRESSURE: 97 MMHG | HEART RATE: 71 BPM | OXYGEN SATURATION: 100 % | HEIGHT: 67 IN | RESPIRATION RATE: 20 BRPM

## 2023-08-16 LAB
ANION GAP SERPL CALCULATED.3IONS-SCNC: 2 MMOL/L (ref 9–17)
BUN SERPL-MCNC: 16 MG/DL (ref 6–20)
BUN/CREAT SERPL: 27 (ref 9–20)
CALCIUM SERPL-MCNC: 7.2 MG/DL (ref 8.6–10.4)
CHLORIDE SERPL-SCNC: 103 MMOL/L (ref 98–107)
CO2 SERPL-SCNC: 34 MMOL/L (ref 20–31)
CREAT SERPL-MCNC: 0.6 MG/DL (ref 0.5–0.9)
ERYTHROCYTE [SEDIMENTATION RATE] IN BLOOD BY PHOTOMETRIC METHOD: 1 MM/HR (ref 0–20)
GFR SERPL CREATININE-BSD FRML MDRD: >60 ML/MIN/1.73M2
GLUCOSE SERPL-MCNC: 81 MG/DL (ref 70–99)
POTASSIUM SERPL-SCNC: 3.6 MMOL/L (ref 3.7–5.3)
SODIUM SERPL-SCNC: 139 MMOL/L (ref 135–144)

## 2023-08-16 PROCEDURE — 6360000002 HC RX W HCPCS: Performed by: NURSE PRACTITIONER

## 2023-08-16 PROCEDURE — 6370000000 HC RX 637 (ALT 250 FOR IP): Performed by: INTERNAL MEDICINE

## 2023-08-16 PROCEDURE — 36415 COLL VENOUS BLD VENIPUNCTURE: CPT

## 2023-08-16 PROCEDURE — 97110 THERAPEUTIC EXERCISES: CPT

## 2023-08-16 PROCEDURE — 85652 RBC SED RATE AUTOMATED: CPT

## 2023-08-16 PROCEDURE — 6360000004 HC RX CONTRAST MEDICATION: Performed by: STUDENT IN AN ORGANIZED HEALTH CARE EDUCATION/TRAINING PROGRAM

## 2023-08-16 PROCEDURE — 97530 THERAPEUTIC ACTIVITIES: CPT

## 2023-08-16 PROCEDURE — 99222 1ST HOSP IP/OBS MODERATE 55: CPT | Performed by: STUDENT IN AN ORGANIZED HEALTH CARE EDUCATION/TRAINING PROGRAM

## 2023-08-16 PROCEDURE — 2580000003 HC RX 258: Performed by: NURSE PRACTITIONER

## 2023-08-16 PROCEDURE — 6370000000 HC RX 637 (ALT 250 FOR IP): Performed by: NURSE PRACTITIONER

## 2023-08-16 PROCEDURE — 72157 MRI CHEST SPINE W/O & W/DYE: CPT

## 2023-08-16 PROCEDURE — 72158 MRI LUMBAR SPINE W/O & W/DYE: CPT

## 2023-08-16 PROCEDURE — A9579 GAD-BASE MR CONTRAST NOS,1ML: HCPCS | Performed by: STUDENT IN AN ORGANIZED HEALTH CARE EDUCATION/TRAINING PROGRAM

## 2023-08-16 PROCEDURE — 80048 BASIC METABOLIC PNL TOTAL CA: CPT

## 2023-08-16 PROCEDURE — 99232 SBSQ HOSP IP/OBS MODERATE 35: CPT | Performed by: INTERNAL MEDICINE

## 2023-08-16 PROCEDURE — 6360000002 HC RX W HCPCS: Performed by: INTERNAL MEDICINE

## 2023-08-16 RX ORDER — MIDODRINE HYDROCHLORIDE 5 MG/1
5 TABLET ORAL ONCE
Status: COMPLETED | OUTPATIENT
Start: 2023-08-16 | End: 2023-08-16

## 2023-08-16 RX ORDER — LORAZEPAM 2 MG/ML
1 INJECTION INTRAMUSCULAR ONCE
Status: COMPLETED | OUTPATIENT
Start: 2023-08-16 | End: 2023-08-16

## 2023-08-16 RX ORDER — MIDODRINE HYDROCHLORIDE 5 MG/1
5 TABLET ORAL 3 TIMES DAILY PRN
Status: DISCONTINUED | OUTPATIENT
Start: 2023-08-16 | End: 2023-08-16 | Stop reason: HOSPADM

## 2023-08-16 RX ORDER — FUROSEMIDE 10 MG/ML
40 INJECTION INTRAMUSCULAR; INTRAVENOUS DAILY
Status: DISCONTINUED | OUTPATIENT
Start: 2023-08-17 | End: 2023-08-16 | Stop reason: HOSPADM

## 2023-08-16 RX ADMIN — MIDODRINE HYDROCHLORIDE 5 MG: 5 TABLET ORAL at 09:35

## 2023-08-16 RX ADMIN — APIXABAN 5 MG: 5 TABLET, FILM COATED ORAL at 09:06

## 2023-08-16 RX ADMIN — FUROSEMIDE 40 MG: 10 INJECTION, SOLUTION INTRAMUSCULAR; INTRAVENOUS at 09:06

## 2023-08-16 RX ADMIN — GADOTERIDOL 15 ML: 279.3 INJECTION, SOLUTION INTRAVENOUS at 18:17

## 2023-08-16 RX ADMIN — CEPHALEXIN 250 MG: 250 CAPSULE ORAL at 05:34

## 2023-08-16 RX ADMIN — PANTOPRAZOLE SODIUM 40 MG: 40 TABLET, DELAYED RELEASE ORAL at 05:36

## 2023-08-16 RX ADMIN — BUPRENORPHINE AND NALOXONE 2 FILM: 8; 2 FILM BUCCAL; SUBLINGUAL at 15:47

## 2023-08-16 RX ADMIN — CEPHALEXIN 250 MG: 250 CAPSULE ORAL at 14:48

## 2023-08-16 RX ADMIN — LORAZEPAM 1 MG: 2 INJECTION INTRAMUSCULAR; INTRAVENOUS at 17:48

## 2023-08-16 RX ADMIN — LEVOTHYROXINE SODIUM 25 MCG: 25 TABLET ORAL at 05:34

## 2023-08-16 RX ADMIN — CEPHALEXIN 250 MG: 250 CAPSULE ORAL at 19:20

## 2023-08-16 RX ADMIN — MIDODRINE HYDROCHLORIDE 5 MG: 5 TABLET ORAL at 00:22

## 2023-08-16 RX ADMIN — SODIUM CHLORIDE, PRESERVATIVE FREE 10 ML: 5 INJECTION INTRAVENOUS at 09:06

## 2023-08-16 NOTE — PROGRESS NOTES
Physical Therapy  Facility/Department: [facility] PROGRESSIVE CARE  Daily Treatment Note  NAME: Mirella Gomez  : 1980  MRN: [id number]    Date of Service: 2023    Discharge Recommendations:  Patient would benefit from continued therapy after discharge        Patient Diagnosis(es): The primary encounter diagnosis was Pericardial effusion. Diagnoses of Other ascites, Dyspnea and respiratory abnormalities, and Pleural effusion were also pertinent to this visit. Assessment   Assessment: Patient given HEP but will need to reviewed supine and seated HEP tomorrow for assess understanding  Activity Tolerance: Treatment limited secondary to medical complications; Patient limited by endurance; Patient limited by fatigue     Plan    Physcial Therapy Plan  General Plan: 5-7 times per week  Current Treatment Recommendations: Strengthening;ROM;Balance training;Functional mobility training;Transfer training;Gait training;Patient/Caregiver education & training;Positioning; Safety education & training; Endurance training; Therapeutic activities; Home exercise program     Restrictions  Restrictions/Precautions  Restrictions/Precautions: General Precautions, Fall Risk  Required Braces or Orthoses?: No  Position Activity Restriction  Other position/activity restrictions: Up w/ assist, telemetry, Zuñiga catheter, RUE IV, maintain heels off bed     Subjective    Subjective  Subjective: Patient reported she thinks she's had a panic attack on the toilet earlier and required extend help back to bed. Her BP was high, sweaty, HR felt very fast and she did not feel okay. This was able 45 mins ago and not okay to get out bed at this time d/t episode. Agreeable for writer to bring written supine HEP for her to perform later if she feels up to it later.     Orientation  Overall Orientation Status: Within Functional Limits  Cognition  Overall Cognitive Status: Exceptions  Arousal/Alertness: Appropriate responses to stimuli  Following Commands: Follows multistep commands consistently  Attention Span: Attends with cues to redirect  Safety Judgement: Decreased awareness of need for safety  Problem Solving: Decreased awareness of errors;Assistance required to identify errors made  Insights: Decreased awareness of deficits  Initiation: Does not require cues  Sequencing: Requires cues for some     Objective   Bed Mobility Training  Bed Mobility Training: No  Transfer Training  Transfer Training: No  Gait Training  Gait Training: No  Neuromuscular Education  Neuromuscular Education: No  PT Exercises  A/AROM Exercises: Created and issued supine and seated HEP to patient. Unfortantely when writer returned to go over exercises. RN, PCT and doctor present to discuss MRI. After doctor left, RN asking medical questions to clear patient to MRI. HEP given to patient's Significant other and educated for patient to perform the supine HEP if she feels up to it and the setaed HEP is if she able to get in the recline tomorrow. Access Code: [email or web address]: Cam[employer]i/Prepared by: Bri Vizcarra  Exercises   Seated Scapular Retraction - 1 x daily - 7 x weekly - 10 reps - 3 sets   Seated Ankle Pumps - 1 x daily - 7 x weekly - 10 reps - 3 sets   Seated Gluteal Sets - 1 x daily - 7 x weekly - 10 reps - 3 sets   Seated Long Arc Quad - 1 x daily - 7 x weekly - 10 reps - 3 sets   Seated March - 1 x daily - 7 x weekly - 10 reps - 3 sets   Seated Hip Abduction - 1 x daily - 7 x weekly - 10 reps - 3 sets   Seated Pursed Lip Breathing - 1 x daily - 7 x weekly - 10 reps - 3 sets   Access Code: [email or web address]: Cam[employer]i/  Prepared by: Bri Vizcarra  Exercises   Supine Ankle Pumps - 1 x daily - 7 x weekly - 10 reps - 3 sets   Supine Quad Set - 1 x daily - 7 x weekly - 10 reps - 3 sets   Supine Heel Slide - 1 x daily - 7 x weekly - 10 reps - 3 sets   Supine Active Straight Leg Raise - 1 x daily - 7 x weekly - 10 reps - 3 sets   Supine Hip Abduction AROM - 1 x daily - 7 x

## 2023-08-16 NOTE — PLAN OF CARE
Pt is resting in bed with her eyes closed with complaints of anxiety. Xanax PRN has not been given d/t low BP. See flowsheet. Spoke with attending for BP, new orders for midodrine have been placed. See orders. Pt is experiencing new onset of numbness in her abdomen as well as her BLE. Pt has new onset of fecal incontinence. Pt experienced a panic attack at 1555 and the xanax was then given. See MAR. Pt has had a great amount of urine output. See I&O. Pt and family were confronted d/t possible drug use. Writer overheard  \"did you bring the bag?\" When bag was checked by secruity multiple drug wrappers were found. Pt was made aware she would no longer be able to have any visitors. Pt did not respond well and decided to leave AMA. Zuñiga removed with no complications. IV removed with no complications.      Problem: Discharge Planning  Goal: Discharge to home or other facility with appropriate resources  8/16/2023 1943 by Alfredo Early RN  Outcome: Progressing        Problem: Respiratory - Adult  Goal: Achieves optimal ventilation and oxygenation  8/16/2023 1943 by Alfredo Early RN  Outcome: Progressing     Problem: Safety - Adult  Goal: Free from fall injury  8/16/2023 1943 by Alfredo Early RN  Outcome: Progressing     Problem: ABCDS Injury Assessment  Goal: Absence of physical injury  Outcome: Progressing

## 2023-08-16 NOTE — PLAN OF CARE
Pt had low BP most of shift and pt asymptomatic. Writer messaged [place] regarding this and concern for over-diuresis because pt was putting out over one liter in less than hour a couple times this shift. Pt received one time midodrine 5mg. BP did come up after administration. Pt had out 5125mL overnight. Bed in lowest position, call light within reach, and bed alarm on.      Problem: Pain  Goal: Verbalizes/displays adequate comfort level or baseline comfort level  8/16/2023 0627 by Shorty Crow RN  Outcome: Progressing    Problem: Safety - Adult  Goal: Free from fall injury  8/16/2023 0627 by Shorty Crow RN  Outcome: Progressing    Problem: ABCDS Injury Assessment  Goal: Absence of physical injury  Outcome: Progressing

## 2023-08-16 NOTE — PROGRESS NOTES
Writer has had this pt for a few days now. She take sublinguel suboxone. Every day at the same time her '\" comes in and goes outside for some time. This time writer notice she grabbed a bag and put something in her hand small enough to hide it in a closed fist. Writer and staff went to go find her outside for her MRI. Couldnt find her any where, did call security to help find her. When we did find her she was behind a white van and the  put up his finger \" like hold on\" and then she came around. Security was asked to check pts belongings while pt was in MRI. Prescription Suboxone was found at bedside and placed in lock up. Lead RN told patient when she was back from MRI that her prescription suboxone strips were locked up in the cabinet, and could have them back when she was discharged. She stated that our suboxone films give her sores in her mouth, and that she wants hers instead. Lead stated we could ask to send them down to pharmacy and dispense to them ourselves when medication is due, but it would have to stay locked up in her cabinet. Also informed pt that she could not have any more visitors. After she was told that she stated she wanted to leave AMA. Lead RN informed her that she should stay because of the amount of urine she was producing and there were no results on the MRI yet. Pt adamant about leaving. Security at bedside during the search and conversation. Pts 2 IV's were removed along with the yadav. Writer attempted to give education about urine retention, but she ignored writer and began talking on the phone. Pt's belonging gathered and wheeled out with pt. Safety maintained.

## 2023-08-17 PROBLEM — R06.00 DYSPNEA AND RESPIRATORY ABNORMALITIES: Status: ACTIVE | Noted: 2023-08-17

## 2023-08-17 PROBLEM — R15.9 INCONTINENCE OF FECES: Status: ACTIVE | Noted: 2023-08-17

## 2023-08-17 PROBLEM — R06.89 DYSPNEA AND RESPIRATORY ABNORMALITIES: Status: ACTIVE | Noted: 2023-08-17

## 2023-09-11 ENCOUNTER — APPOINTMENT (OUTPATIENT)
Dept: CT IMAGING | Age: 43
DRG: 720 | End: 2023-09-11
Payer: MEDICAID

## 2023-09-11 ENCOUNTER — APPOINTMENT (OUTPATIENT)
Dept: GENERAL RADIOLOGY | Age: 43
DRG: 720 | End: 2023-09-11
Payer: MEDICAID

## 2023-09-11 ENCOUNTER — HOSPITAL ENCOUNTER (INPATIENT)
Age: 43
LOS: 1 days | DRG: 720 | End: 2023-09-12
Attending: EMERGENCY MEDICINE | Admitting: INTERNAL MEDICINE
Payer: MEDICAID

## 2023-09-11 DIAGNOSIS — I46.9 CARDIAC ARREST (HCC): Primary | ICD-10-CM

## 2023-09-11 PROBLEM — N17.9 AKI (ACUTE KIDNEY INJURY) (HCC): Status: ACTIVE | Noted: 2023-09-11

## 2023-09-11 PROBLEM — A41.9 SEPTIC SHOCK (HCC): Status: ACTIVE | Noted: 2023-09-11

## 2023-09-11 PROBLEM — S06.6X3A TRAUMATIC SUBARACHNOID HEMORRHAGE WITH LOSS OF CONSCIOUSNESS OF 1 HOUR TO 5 HOURS 59 MINUTES (HCC): Status: ACTIVE | Noted: 2023-09-11

## 2023-09-11 PROBLEM — E03.9 HYPOTHYROIDISM: Status: ACTIVE | Noted: 2023-09-11

## 2023-09-11 PROBLEM — G93.41 METABOLIC ENCEPHALOPATHY: Status: ACTIVE | Noted: 2023-09-11

## 2023-09-11 PROBLEM — E87.20 LACTIC ACIDOSIS: Status: ACTIVE | Noted: 2023-09-11

## 2023-09-11 PROBLEM — R65.21 SEPTIC SHOCK (HCC): Status: ACTIVE | Noted: 2023-09-11

## 2023-09-11 LAB
ALBUMIN SERPL-MCNC: 0.8 G/DL (ref 3.5–5.2)
ALBUMIN SERPL-MCNC: 1.4 G/DL (ref 3.5–5.2)
ALBUMIN/GLOB SERPL: 0.9 {RATIO} (ref 1–2.5)
ALBUMIN/GLOB SERPL: 1.1 {RATIO} (ref 1–2.5)
ALLEN TEST: ABNORMAL
ALP SERPL-CCNC: 100 U/L (ref 35–104)
ALP SERPL-CCNC: 63 U/L (ref 35–104)
ALT SERPL-CCNC: 31 U/L (ref 5–33)
ALT SERPL-CCNC: 42 U/L (ref 5–33)
AMPHET UR QL SCN: NEGATIVE
AMPHETAMINE: ABNORMAL
ANION GAP SERPL CALCULATED.3IONS-SCNC: 13 MMOL/L (ref 9–17)
ANION GAP SERPL CALCULATED.3IONS-SCNC: 14 MMOL/L (ref 9–17)
ANION GAP SERPL CALCULATED.3IONS-SCNC: 18 MMOL/L (ref 9–17)
ANION GAP SERPL CALCULATED.3IONS-SCNC: 18 MMOL/L (ref 9–17)
ANION GAP SERPL CALCULATED.3IONS-SCNC: 25 MMOL/L (ref 9–17)
APAP SERPL-MCNC: <5 UG/ML (ref 10–30)
APAP SERPL-MCNC: <5 UG/ML (ref 10–30)
AST SERPL-CCNC: 40 U/L
AST SERPL-CCNC: 45 U/L
B PARAP IS1001 DNA NPH QL NAA+NON-PROBE: NOT DETECTED
B PERT DNA SPEC QL NAA+PROBE: NOT DETECTED
B-OH-BUTYR SERPL-MCNC: 0.1 MMOL/L (ref 0.02–0.27)
BACTERIA URNS QL MICRO: ABNORMAL
BARBITURATES UR QL SCN: NEGATIVE
BARBITURATES: ABNORMAL
BASOPHILS # BLD: 0 K/UL (ref 0–0.2)
BASOPHILS # BLD: 0 K/UL (ref 0–0.2)
BASOPHILS NFR BLD: 0 % (ref 0–2)
BASOPHILS NFR BLD: 0 % (ref 0–2)
BENZODIAZ UR QL: POSITIVE
BENZODIAZEPINES: ABNORMAL
BILIRUB DIRECT SERPL-MCNC: 0.2 MG/DL
BILIRUB DIRECT SERPL-MCNC: 0.2 MG/DL
BILIRUB INDIRECT SERPL-MCNC: 0.1 MG/DL (ref 0–1)
BILIRUB INDIRECT SERPL-MCNC: 0.2 MG/DL (ref 0–1)
BILIRUB SERPL-MCNC: 0.3 MG/DL (ref 0.3–1.2)
BILIRUB SERPL-MCNC: 0.4 MG/DL (ref 0.3–1.2)
BILIRUB UR QL STRIP: NEGATIVE
BUN BLD-MCNC: 43 MG/DL (ref 8–26)
BUN SERPL-MCNC: 33 MG/DL (ref 6–20)
BUN SERPL-MCNC: 35 MG/DL (ref 6–20)
BUN SERPL-MCNC: 37 MG/DL (ref 6–20)
BUN SERPL-MCNC: 38 MG/DL (ref 6–20)
BUN SERPL-MCNC: 42 MG/DL (ref 6–20)
BUPRENORPHINE: ABNORMAL
C PNEUM DNA NPH QL NAA+NON-PROBE: NOT DETECTED
CA-I BLD-SCNC: 1.09 MMOL/L (ref 1.13–1.33)
CA-I BLD-SCNC: 1.21 MMOL/L (ref 1.15–1.33)
CALCIUM SERPL-MCNC: 5.2 MG/DL (ref 8.6–10.4)
CALCIUM SERPL-MCNC: 5.2 MG/DL (ref 8.6–10.4)
CALCIUM SERPL-MCNC: 5.6 MG/DL (ref 8.6–10.4)
CALCIUM SERPL-MCNC: 6.1 MG/DL (ref 8.6–10.4)
CALCIUM SERPL-MCNC: 6.7 MG/DL (ref 8.6–10.4)
CANNABINOIDS UR QL SCN: NEGATIVE
CASTS #/AREA URNS LPF: ABNORMAL /LPF (ref 0–8)
CHLORIDE BLD-SCNC: 99 MMOL/L (ref 98–107)
CHLORIDE SERPL-SCNC: 100 MMOL/L (ref 98–107)
CHLORIDE SERPL-SCNC: 100 MMOL/L (ref 98–107)
CHLORIDE SERPL-SCNC: 101 MMOL/L (ref 98–107)
CHLORIDE SERPL-SCNC: 105 MMOL/L (ref 98–107)
CHLORIDE SERPL-SCNC: 109 MMOL/L (ref 98–107)
CLARITY UR: ABNORMAL
CO2 BLD CALC-SCNC: 14 MMOL/L (ref 22–30)
CO2 SERPL-SCNC: 11 MMOL/L (ref 20–31)
CO2 SERPL-SCNC: 11 MMOL/L (ref 20–31)
CO2 SERPL-SCNC: 14 MMOL/L (ref 20–31)
CO2 SERPL-SCNC: 17 MMOL/L (ref 20–31)
CO2 SERPL-SCNC: 17 MMOL/L (ref 20–31)
COCAINE UR QL SCN: NEGATIVE
COCAINE: ABNORMAL
COLOR UR: ABNORMAL
CREAT SERPL-MCNC: 1.2 MG/DL (ref 0.5–0.9)
CREAT SERPL-MCNC: 1.3 MG/DL (ref 0.5–0.9)
CREAT SERPL-MCNC: 1.4 MG/DL (ref 0.5–0.9)
CREAT UR-MCNC: 95.5 MG/DL (ref 28–217)
CRITICAL ACTION: NORMAL
CRITICAL NOTIFICATION DATE/TIME: NORMAL
CRITICAL NOTIFICATION: NORMAL
CRITICAL VALUE READ BACK: YES
DRUGS OF ABUSE COMMENT: ABNORMAL
EGFR, POC: >60 ML/MIN/1.73M2
EOSINOPHIL # BLD: 0 K/UL (ref 0–0.4)
EOSINOPHIL # BLD: 0.04 K/UL (ref 0–0.4)
EOSINOPHILS RELATIVE PERCENT: 0 % (ref 1–4)
EOSINOPHILS RELATIVE PERCENT: 1 % (ref 1–4)
EPI CELLS #/AREA URNS HPF: ABNORMAL /HPF (ref 0–5)
ERYTHROCYTE [DISTWIDTH] IN BLOOD BY AUTOMATED COUNT: 22.5 % (ref 11.8–14.4)
ERYTHROCYTE [DISTWIDTH] IN BLOOD BY AUTOMATED COUNT: 22.5 % (ref 11.8–14.4)
ETHANOL PERCENT: <0.01 %
ETHANOL PERCENT: <0.01 %
ETHANOLAMINE SERPL-MCNC: <10 MG/DL
ETHANOLAMINE SERPL-MCNC: <10 MG/DL
FDP: <5 UG/ML
FENTANYL UR QL: NEGATIVE
FIBRINOGEN PPP-MCNC: 105 MG/DL (ref 203–521)
FIBRINOGEN PPP-MCNC: 106 MG/DL (ref 203–521)
FIO2: 100
FIO2: 60
FIO2: 60
FIO2: 80
FLUAV RNA NPH QL NAA+NON-PROBE: NOT DETECTED
FLUBV RNA NPH QL NAA+NON-PROBE: NOT DETECTED
GFR SERPL CREATININE-BSD FRML MDRD: 48 ML/MIN/1.73M2
GFR SERPL CREATININE-BSD FRML MDRD: 53 ML/MIN/1.73M2
GFR SERPL CREATININE-BSD FRML MDRD: 58 ML/MIN/1.73M2
GLUCOSE BLD-MCNC: 100 MG/DL (ref 65–105)
GLUCOSE BLD-MCNC: 111 MG/DL (ref 65–105)
GLUCOSE BLD-MCNC: 113 MG/DL (ref 65–105)
GLUCOSE BLD-MCNC: 115 MG/DL (ref 74–100)
GLUCOSE BLD-MCNC: 134 MG/DL (ref 74–100)
GLUCOSE BLD-MCNC: 46 MG/DL (ref 65–105)
GLUCOSE BLD-MCNC: 48 MG/DL (ref 65–105)
GLUCOSE BLD-MCNC: 52 MG/DL (ref 74–100)
GLUCOSE BLD-MCNC: 60 MG/DL (ref 65–105)
GLUCOSE BLD-MCNC: 60 MG/DL (ref 65–105)
GLUCOSE BLD-MCNC: 61 MG/DL (ref 65–105)
GLUCOSE BLD-MCNC: 66 MG/DL (ref 74–100)
GLUCOSE BLD-MCNC: 71 MG/DL (ref 65–105)
GLUCOSE BLD-MCNC: 74 MG/DL (ref 74–100)
GLUCOSE BLD-MCNC: 83 MG/DL (ref 65–105)
GLUCOSE BLD-MCNC: 88 MG/DL (ref 65–105)
GLUCOSE BLD-MCNC: 94 MG/DL (ref 65–105)
GLUCOSE SERPL-MCNC: 106 MG/DL (ref 70–99)
GLUCOSE SERPL-MCNC: 45 MG/DL (ref 70–99)
GLUCOSE SERPL-MCNC: 73 MG/DL (ref 70–99)
GLUCOSE SERPL-MCNC: 77 MG/DL (ref 70–99)
GLUCOSE SERPL-MCNC: 81 MG/DL (ref 70–99)
GLUCOSE UR STRIP-MCNC: NEGATIVE MG/DL
HADV DNA NPH QL NAA+NON-PROBE: NOT DETECTED
HCO3 VENOUS: 13.7 MMOL/L (ref 22–29)
HCOV 229E RNA NPH QL NAA+NON-PROBE: NOT DETECTED
HCOV HKU1 RNA NPH QL NAA+NON-PROBE: NOT DETECTED
HCOV NL63 RNA NPH QL NAA+NON-PROBE: NOT DETECTED
HCOV OC43 RNA NPH QL NAA+NON-PROBE: NOT DETECTED
HCT VFR BLD AUTO: 26 % (ref 36.3–47.1)
HCT VFR BLD AUTO: 36.7 % (ref 36.3–47.1)
HCT VFR BLD AUTO: 38 % (ref 36–46)
HGB BLD-MCNC: 11.4 G/DL (ref 11.9–15.1)
HGB BLD-MCNC: 8.6 G/DL (ref 11.9–15.1)
HGB UR QL STRIP.AUTO: ABNORMAL
HMPV RNA NPH QL NAA+NON-PROBE: NOT DETECTED
HPIV1 RNA NPH QL NAA+NON-PROBE: NOT DETECTED
HPIV2 RNA NPH QL NAA+NON-PROBE: NOT DETECTED
HPIV3 RNA NPH QL NAA+NON-PROBE: NOT DETECTED
HPIV4 RNA NPH QL NAA+NON-PROBE: NOT DETECTED
IMM GRANULOCYTES # BLD AUTO: 0.04 K/UL (ref 0–0.3)
IMM GRANULOCYTES # BLD AUTO: 0.81 K/UL (ref 0–0.3)
IMM GRANULOCYTES NFR BLD: 1 %
IMM GRANULOCYTES NFR BLD: 35 %
INR PPP: 5.7
INR PPP: 5.9
INR PPP: 6.8
INR PPP: 9.8
KETONES UR STRIP-MCNC: NEGATIVE MG/DL
LACTIC ACID, SEPSIS WHOLE BLOOD: 6.6 MMOL/L (ref 0.5–1.9)
LACTIC ACID, SEPSIS WHOLE BLOOD: 9 MMOL/L (ref 0.5–1.9)
LACTIC ACID, WHOLE BLOOD: 11.1 MMOL/L (ref 0.7–2.1)
LACTIC ACID, WHOLE BLOOD: 24 MMOL/L (ref 0.7–2.1)
LACTIC ACID, WHOLE BLOOD: 7.5 MMOL/L (ref 0.7–2.1)
LEUKOCYTE ESTERASE UR QL STRIP: ABNORMAL
LIPASE SERPL-CCNC: 3 U/L (ref 13–60)
LYMPHOCYTES NFR BLD: 0.44 K/UL (ref 1–4.8)
LYMPHOCYTES NFR BLD: 1.03 K/UL (ref 1–4.8)
LYMPHOCYTES RELATIVE PERCENT: 19 % (ref 24–44)
LYMPHOCYTES RELATIVE PERCENT: 25 % (ref 24–44)
M PNEUMO DNA NPH QL NAA+NON-PROBE: NOT DETECTED
MAGNESIUM SERPL-MCNC: 1.5 MG/DL (ref 1.6–2.6)
MAGNESIUM SERPL-MCNC: 1.6 MG/DL (ref 1.6–2.6)
MAGNESIUM SERPL-MCNC: 1.9 MG/DL (ref 1.6–2.6)
MCH RBC QN AUTO: 27.9 PG (ref 25.2–33.5)
MCH RBC QN AUTO: 29 PG (ref 25.2–33.5)
MCHC RBC AUTO-ENTMCNC: 31.1 G/DL (ref 28.4–34.8)
MCHC RBC AUTO-ENTMCNC: 33.1 G/DL (ref 28.4–34.8)
MCV RBC AUTO: 87.5 FL (ref 82.6–102.9)
MCV RBC AUTO: 89.7 FL (ref 82.6–102.9)
METHADONE UR QL: NEGATIVE
METHADONE: ABNORMAL
METHAMPHETAMINE: ABNORMAL
MODE: ABNORMAL
MODE: ABNORMAL
MONOCYTES NFR BLD: 0.04 K/UL (ref 0.1–0.8)
MONOCYTES NFR BLD: 0.07 K/UL (ref 0.1–0.8)
MONOCYTES NFR BLD: 1 % (ref 1–7)
MONOCYTES NFR BLD: 3 % (ref 1–7)
MORPHOLOGY: ABNORMAL
NEGATIVE BASE EXCESS, ART: 10.5 MMOL/L (ref 0–2)
NEGATIVE BASE EXCESS, ART: 12.6 MMOL/L (ref 0–2)
NEGATIVE BASE EXCESS, ART: 14.9 MMOL/L (ref 0–2)
NEGATIVE BASE EXCESS, ART: 7 MMOL/L (ref 0–2)
NEGATIVE BASE EXCESS, VEN: 16.3 MMOL/L (ref 0–2)
NEUTROPHILS NFR BLD: 43 % (ref 36–66)
NEUTROPHILS NFR BLD: 72 % (ref 36–66)
NEUTS SEG NFR BLD: 0.98 K/UL (ref 1.8–7.7)
NEUTS SEG NFR BLD: 2.95 K/UL (ref 1.8–7.7)
NITRITE UR QL STRIP: NEGATIVE
NRBC BLD-RTO: 0.5 PER 100 WBC
NRBC BLD-RTO: 1.3 PER 100 WBC
NUCLEATED RED BLOOD CELLS: 1 PER 100 WBC
O2 DELIVERY DEVICE: ABNORMAL
O2 SAT, VEN: 82.2 % (ref 60–85)
OPIATES UR QL SCN: NEGATIVE
OPIATES: ABNORMAL
OXYCODONE UR QL SCN: NEGATIVE
OXYCODONE: ABNORMAL
PARTIAL THROMBOPLASTIN TIME: 55.4 SEC (ref 23–36.5)
PARTIAL THROMBOPLASTIN TIME: 63.2 SEC (ref 23–36.5)
PATIENT TEMP: 35.1
PATIENT TEMP: 35.2
PATIENT TEMP: 36
PATIENT TEMP: 36
PCO2, VEN: 48.2 MM HG (ref 41–51)
PCP UR QL SCN: NEGATIVE
PH UR STRIP: >9 [PH] (ref 5–8)
PH VENOUS: 7.06 (ref 7.32–7.43)
PHENCYCLIDINE: ABNORMAL
PLATELET # BLD AUTO: 219 K/UL (ref 138–453)
PLATELET # BLD AUTO: 79 K/UL (ref 138–453)
PLATELET, FLUORESCENCE: 79 K/UL (ref 138–453)
PMV BLD AUTO: 11.3 FL (ref 8.1–13.5)
PMV BLD AUTO: 9.5 FL (ref 8.1–13.5)
PO2, VEN: 65.4 MM HG (ref 30–50)
POC ANION GAP: 18 MMOL/L (ref 7–16)
POC CREATININE: 0.9 MG/DL (ref 0.51–1.19)
POC HCO3: 12.7 MMOL/L (ref 21–28)
POC HCO3: 14.3 MMOL/L (ref 21–28)
POC HCO3: 15.5 MMOL/L (ref 21–28)
POC HCO3: 18.4 MMOL/L (ref 21–28)
POC HEMOGLOBIN (CALC): 13.1 G/DL (ref 12–16)
POC LACTIC ACID: 5 MMOL/L (ref 0.56–1.39)
POC LACTIC ACID: 8.7 MMOL/L (ref 0.56–1.39)
POC O2 SATURATION: 100 % (ref 94–98)
POC O2 SATURATION: 100 % (ref 94–98)
POC O2 SATURATION: 99.5 % (ref 94–98)
POC O2 SATURATION: 99.7 % (ref 94–98)
POC PCO2 TEMP: 31.2 MM HG
POC PCO2 TEMP: 38.1 MM HG
POC PCO2: 33.9 MM HG (ref 35–48)
POC PCO2: 35.4 MM HG (ref 35–48)
POC PCO2: 35.7 MM HG (ref 35–48)
POC PCO2: 41.2 MM HG (ref 35–48)
POC PH TEMP: 7.12
POC PH TEMP: 7.29
POC PH: 7.09 (ref 7.35–7.45)
POC PH: 7.21 (ref 7.35–7.45)
POC PH: 7.27 (ref 7.35–7.45)
POC PH: 7.32 (ref 7.35–7.45)
POC PO2 TEMP: 177 MM HG
POC PO2 TEMP: 271.9 MM HG
POC PO2: 186.9 MM HG (ref 83–108)
POC PO2: 280.4 MM HG (ref 83–108)
POC PO2: 456.3 MM HG (ref 83–108)
POC PO2: 485.1 MM HG (ref 83–108)
POTASSIUM BLD-SCNC: 3.3 MMOL/L (ref 3.5–4.5)
POTASSIUM SERPL-SCNC: 3.3 MMOL/L (ref 3.7–5.3)
POTASSIUM SERPL-SCNC: 3.5 MMOL/L (ref 3.7–5.3)
POTASSIUM SERPL-SCNC: 4.1 MMOL/L (ref 3.7–5.3)
PROT SERPL-MCNC: 1.7 G/DL (ref 6.4–8.3)
PROT SERPL-MCNC: 2.7 G/DL (ref 6.4–8.3)
PROT UR STRIP-MCNC: ABNORMAL MG/DL
PROTHROMBIN TIME: 50.5 SEC (ref 11.7–14.9)
PROTHROMBIN TIME: 51.7 SEC (ref 11.7–14.9)
PROTHROMBIN TIME: 57.8 SEC (ref 11.7–14.9)
PROTHROMBIN TIME: 77.4 SEC (ref 11.7–14.9)
RBC # BLD AUTO: 2.97 M/UL (ref 3.95–5.11)
RBC # BLD AUTO: 4.09 M/UL (ref 3.95–5.11)
RBC #/AREA URNS HPF: ABNORMAL /HPF (ref 0–4)
REASON FOR REJECTION: NORMAL
RSV RNA NPH QL NAA+NON-PROBE: NOT DETECTED
RV+EV RNA NPH QL NAA+NON-PROBE: NOT DETECTED
SALICYLATES SERPL-MCNC: <1 MG/DL (ref 3–10)
SALICYLATES SERPL-MCNC: <1 MG/DL (ref 3–10)
SAMPLE SITE: ABNORMAL
SARS-COV-2 RNA NPH QL NAA+NON-PROBE: NOT DETECTED
SODIUM BLD-SCNC: 130 MMOL/L (ref 138–146)
SODIUM SERPL-SCNC: 128 MMOL/L (ref 135–144)
SODIUM SERPL-SCNC: 130 MMOL/L (ref 135–144)
SODIUM SERPL-SCNC: 136 MMOL/L (ref 135–144)
SODIUM SERPL-SCNC: 139 MMOL/L (ref 135–144)
SODIUM SERPL-SCNC: 140 MMOL/L (ref 135–144)
SP GR UR STRIP: 1.02 (ref 1–1.03)
SPECIMEN DESCRIPTION: NORMAL
SPECIMEN SOURCE: NORMAL
T4 FREE SERPL-MCNC: 0.4 NG/DL (ref 0.9–1.7)
TEST INFORMATION: ABNORMAL
THC: ABNORMAL
TOTAL PROTEIN, URINE: 83 MG/DL
TOXIC TRICYCLIC SC,BLOOD: NEGATIVE
TOXIC TRICYCLIC SC,BLOOD: NEGATIVE
TROPONIN I SERPL HS-MCNC: 32 NG/L (ref 0–14)
TROPONIN I SERPL HS-MCNC: 67 NG/L (ref 0–14)
TROPONIN I SERPL HS-MCNC: 72 NG/L (ref 0–14)
TROPONIN I SERPL HS-MCNC: 75 NG/L (ref 0–14)
TROPONIN I SERPL HS-MCNC: 92 NG/L (ref 0–14)
TSH SERPL DL<=0.05 MIU/L-ACNC: 10.86 UIU/ML (ref 0.3–5)
URINE TOTAL PROTEIN CREATININE RATIO: 0.87 (ref 0–0.2)
UROBILINOGEN UR STRIP-ACNC: NORMAL EU/DL (ref 0–1)
WBC #/AREA URNS HPF: ABNORMAL /HPF (ref 0–5)
WBC OTHER # BLD: 2.3 K/UL (ref 3.5–11.3)
WBC OTHER # BLD: 4.1 K/UL (ref 3.5–11.3)
ZZ NTE CLEAN UP: ORDERED TEST: NORMAL

## 2023-09-11 PROCEDURE — 70450 CT HEAD/BRAIN W/O DYE: CPT

## 2023-09-11 PROCEDURE — 87205 SMEAR GRAM STAIN: CPT

## 2023-09-11 PROCEDURE — 86901 BLOOD TYPING SEROLOGIC RH(D): CPT

## 2023-09-11 PROCEDURE — 71260 CT THORAX DX C+: CPT

## 2023-09-11 PROCEDURE — 94761 N-INVAS EAR/PLS OXIMETRY MLT: CPT

## 2023-09-11 PROCEDURE — 2500000003 HC RX 250 WO HCPCS

## 2023-09-11 PROCEDURE — 80179 DRUG ASSAY SALICYLATE: CPT

## 2023-09-11 PROCEDURE — 2580000003 HC RX 258

## 2023-09-11 PROCEDURE — 85362 FIBRIN DEGRADATION PRODUCTS: CPT

## 2023-09-11 PROCEDURE — 82010 KETONE BODYS QUAN: CPT

## 2023-09-11 PROCEDURE — 81001 URINALYSIS AUTO W/SCOPE: CPT

## 2023-09-11 PROCEDURE — 80307 DRUG TEST PRSMV CHEM ANLYZR: CPT

## 2023-09-11 PROCEDURE — 36620 INSERTION CATHETER ARTERY: CPT

## 2023-09-11 PROCEDURE — 83605 ASSAY OF LACTIC ACID: CPT

## 2023-09-11 PROCEDURE — 87077 CULTURE AEROBIC IDENTIFY: CPT

## 2023-09-11 PROCEDURE — G0480 DRUG TEST DEF 1-7 CLASSES: HCPCS

## 2023-09-11 PROCEDURE — 86900 BLOOD TYPING SEROLOGIC ABO: CPT

## 2023-09-11 PROCEDURE — 85347 COAGULATION TIME ACTIVATED: CPT

## 2023-09-11 PROCEDURE — 87186 SC STD MICRODIL/AGAR DIL: CPT

## 2023-09-11 PROCEDURE — 87088 URINE BACTERIA CULTURE: CPT

## 2023-09-11 PROCEDURE — 6360000002 HC RX W HCPCS

## 2023-09-11 PROCEDURE — P9017 PLASMA 1 DONOR FRZ W/IN 8 HR: HCPCS

## 2023-09-11 PROCEDURE — 2700000000 HC OXYGEN THERAPY PER DAY

## 2023-09-11 PROCEDURE — 82947 ASSAY GLUCOSE BLOOD QUANT: CPT

## 2023-09-11 PROCEDURE — 85384 FIBRINOGEN ACTIVITY: CPT

## 2023-09-11 PROCEDURE — 99285 EMERGENCY DEPT VISIT HI MDM: CPT

## 2023-09-11 PROCEDURE — 0202U NFCT DS 22 TRGT SARS-COV-2: CPT

## 2023-09-11 PROCEDURE — 36600 WITHDRAWAL OF ARTERIAL BLOOD: CPT

## 2023-09-11 PROCEDURE — 87154 CUL TYP ID BLD PTHGN 6+ TRGT: CPT

## 2023-09-11 PROCEDURE — 86927 PLASMA FRESH FROZEN: CPT

## 2023-09-11 PROCEDURE — 82570 ASSAY OF URINE CREATININE: CPT

## 2023-09-11 PROCEDURE — 99292 CRITICAL CARE ADDL 30 MIN: CPT | Performed by: INTERNAL MEDICINE

## 2023-09-11 PROCEDURE — C9113 INJ PANTOPRAZOLE SODIUM, VIA: HCPCS

## 2023-09-11 PROCEDURE — 80143 DRUG ASSAY ACETAMINOPHEN: CPT

## 2023-09-11 PROCEDURE — 83735 ASSAY OF MAGNESIUM: CPT

## 2023-09-11 PROCEDURE — 36430 TRANSFUSION BLD/BLD COMPNT: CPT

## 2023-09-11 PROCEDURE — 85576 BLOOD PLATELET AGGREGATION: CPT

## 2023-09-11 PROCEDURE — 86920 COMPATIBILITY TEST SPIN: CPT

## 2023-09-11 PROCEDURE — 84439 ASSAY OF FREE THYROXINE: CPT

## 2023-09-11 PROCEDURE — 36415 COLL VENOUS BLD VENIPUNCTURE: CPT

## 2023-09-11 PROCEDURE — 99223 1ST HOSP IP/OBS HIGH 75: CPT | Performed by: INTERNAL MEDICINE

## 2023-09-11 PROCEDURE — 6360000004 HC RX CONTRAST MEDICATION

## 2023-09-11 PROCEDURE — 84443 ASSAY THYROID STIM HORMONE: CPT

## 2023-09-11 PROCEDURE — 74177 CT ABD & PELVIS W/CONTRAST: CPT

## 2023-09-11 PROCEDURE — 85014 HEMATOCRIT: CPT

## 2023-09-11 PROCEDURE — 82803 BLOOD GASES ANY COMBINATION: CPT

## 2023-09-11 PROCEDURE — 71045 X-RAY EXAM CHEST 1 VIEW: CPT

## 2023-09-11 PROCEDURE — 72125 CT NECK SPINE W/O DYE: CPT

## 2023-09-11 PROCEDURE — 85025 COMPLETE CBC W/AUTO DIFF WBC: CPT

## 2023-09-11 PROCEDURE — 99221 1ST HOSP IP/OBS SF/LOW 40: CPT | Performed by: NEUROLOGICAL SURGERY

## 2023-09-11 PROCEDURE — 80048 BASIC METABOLIC PNL TOTAL CA: CPT

## 2023-09-11 PROCEDURE — 84156 ASSAY OF PROTEIN URINE: CPT

## 2023-09-11 PROCEDURE — 82330 ASSAY OF CALCIUM: CPT

## 2023-09-11 PROCEDURE — 87086 URINE CULTURE/COLONY COUNT: CPT

## 2023-09-11 PROCEDURE — 82565 ASSAY OF CREATININE: CPT

## 2023-09-11 PROCEDURE — 84520 ASSAY OF UREA NITROGEN: CPT

## 2023-09-11 PROCEDURE — 37799 UNLISTED PX VASCULAR SURGERY: CPT

## 2023-09-11 PROCEDURE — 87040 BLOOD CULTURE FOR BACTERIA: CPT

## 2023-09-11 PROCEDURE — 2000000000 HC ICU R&B

## 2023-09-11 PROCEDURE — 85390 FIBRINOLYSINS SCREEN I&R: CPT

## 2023-09-11 PROCEDURE — 85610 PROTHROMBIN TIME: CPT

## 2023-09-11 PROCEDURE — 83690 ASSAY OF LIPASE: CPT

## 2023-09-11 PROCEDURE — 5A1935Z RESPIRATORY VENTILATION, LESS THAN 24 CONSECUTIVE HOURS: ICD-10-PCS | Performed by: EMERGENCY MEDICINE

## 2023-09-11 PROCEDURE — 80076 HEPATIC FUNCTION PANEL: CPT

## 2023-09-11 PROCEDURE — 84484 ASSAY OF TROPONIN QUANT: CPT

## 2023-09-11 PROCEDURE — 80051 ELECTROLYTE PANEL: CPT

## 2023-09-11 PROCEDURE — 70498 CT ANGIOGRAPHY NECK: CPT

## 2023-09-11 PROCEDURE — 86850 RBC ANTIBODY SCREEN: CPT

## 2023-09-11 PROCEDURE — 85730 THROMBOPLASTIN TIME PARTIAL: CPT

## 2023-09-11 PROCEDURE — 94002 VENT MGMT INPAT INIT DAY: CPT

## 2023-09-11 PROCEDURE — 99291 CRITICAL CARE FIRST HOUR: CPT | Performed by: INTERNAL MEDICINE

## 2023-09-11 PROCEDURE — 94003 VENT MGMT INPAT SUBQ DAY: CPT

## 2023-09-11 RX ORDER — POLYETHYLENE GLYCOL 3350 17 G/17G
17 POWDER, FOR SOLUTION ORAL DAILY PRN
Status: DISCONTINUED | OUTPATIENT
Start: 2023-09-11 | End: 2023-09-12

## 2023-09-11 RX ORDER — DEXTROSE MONOHYDRATE 25 G/50ML
INJECTION, SOLUTION INTRAVENOUS
Status: DISPENSED
Start: 2023-09-11 | End: 2023-09-11

## 2023-09-11 RX ORDER — SODIUM CHLORIDE 9 MG/ML
INJECTION, SOLUTION INTRAVENOUS PRN
Status: DISCONTINUED | OUTPATIENT
Start: 2023-09-11 | End: 2023-09-12

## 2023-09-11 RX ORDER — SODIUM CHLORIDE 0.9 % (FLUSH) 0.9 %
5-40 SYRINGE (ML) INJECTION PRN
Status: DISCONTINUED | OUTPATIENT
Start: 2023-09-11 | End: 2023-09-12 | Stop reason: HOSPADM

## 2023-09-11 RX ORDER — DEXTROSE MONOHYDRATE 25 G/50ML
25 INJECTION, SOLUTION INTRAVENOUS ONCE
Status: COMPLETED | OUTPATIENT
Start: 2023-09-11 | End: 2023-09-11

## 2023-09-11 RX ORDER — PROPOFOL 10 MG/ML
INJECTION, EMULSION INTRAVENOUS
Status: DISCONTINUED
Start: 2023-09-11 | End: 2023-09-11

## 2023-09-11 RX ORDER — MIDAZOLAM HYDROCHLORIDE 1 MG/ML
1-10 INJECTION, SOLUTION INTRAVENOUS CONTINUOUS
Status: DISCONTINUED | OUTPATIENT
Start: 2023-09-11 | End: 2023-09-12

## 2023-09-11 RX ORDER — ONDANSETRON 4 MG/1
4 TABLET, ORALLY DISINTEGRATING ORAL EVERY 8 HOURS PRN
Status: DISCONTINUED | OUTPATIENT
Start: 2023-09-11 | End: 2023-09-12

## 2023-09-11 RX ORDER — 0.9 % SODIUM CHLORIDE 0.9 %
500 INTRAVENOUS SOLUTION INTRAVENOUS ONCE
Status: COMPLETED | OUTPATIENT
Start: 2023-09-11 | End: 2023-09-11

## 2023-09-11 RX ORDER — 0.9 % SODIUM CHLORIDE 0.9 %
1000 INTRAVENOUS SOLUTION INTRAVENOUS ONCE
Status: COMPLETED | OUTPATIENT
Start: 2023-09-11 | End: 2023-09-11

## 2023-09-11 RX ORDER — POTASSIUM CHLORIDE 20 MEQ/1
40 TABLET, EXTENDED RELEASE ORAL PRN
Status: DISCONTINUED | OUTPATIENT
Start: 2023-09-11 | End: 2023-09-12

## 2023-09-11 RX ORDER — POTASSIUM CHLORIDE 29.8 MG/ML
20 INJECTION INTRAVENOUS ONCE
Status: COMPLETED | OUTPATIENT
Start: 2023-09-11 | End: 2023-09-11

## 2023-09-11 RX ORDER — 0.9 % SODIUM CHLORIDE 0.9 %
500 INTRAVENOUS SOLUTION INTRAVENOUS ONCE
Status: DISCONTINUED | OUTPATIENT
Start: 2023-09-11 | End: 2023-09-11

## 2023-09-11 RX ORDER — NOREPINEPHRINE BITARTRATE 0.06 MG/ML
INJECTION, SOLUTION INTRAVENOUS
Status: COMPLETED
Start: 2023-09-11 | End: 2023-09-11

## 2023-09-11 RX ORDER — DEXTROSE MONOHYDRATE 100 MG/ML
INJECTION, SOLUTION INTRAVENOUS CONTINUOUS PRN
Status: DISCONTINUED | OUTPATIENT
Start: 2023-09-11 | End: 2023-09-12

## 2023-09-11 RX ORDER — SODIUM CHLORIDE 9 MG/ML
INJECTION, SOLUTION INTRAVENOUS CONTINUOUS
Status: DISCONTINUED | OUTPATIENT
Start: 2023-09-11 | End: 2023-09-12

## 2023-09-11 RX ORDER — ACETAMINOPHEN 325 MG/1
650 TABLET ORAL EVERY 6 HOURS PRN
Status: DISCONTINUED | OUTPATIENT
Start: 2023-09-11 | End: 2023-09-12

## 2023-09-11 RX ORDER — POTASSIUM CHLORIDE 7.45 MG/ML
10 INJECTION INTRAVENOUS PRN
Status: DISCONTINUED | OUTPATIENT
Start: 2023-09-11 | End: 2023-09-11

## 2023-09-11 RX ORDER — MIDAZOLAM HYDROCHLORIDE 2 MG/2ML
2 INJECTION, SOLUTION INTRAMUSCULAR; INTRAVENOUS ONCE
Status: DISCONTINUED | OUTPATIENT
Start: 2023-09-11 | End: 2023-09-12

## 2023-09-11 RX ORDER — NOREPINEPHRINE BITARTRATE 0.06 MG/ML
1-100 INJECTION, SOLUTION INTRAVENOUS CONTINUOUS
Status: DISCONTINUED | OUTPATIENT
Start: 2023-09-11 | End: 2023-09-11

## 2023-09-11 RX ORDER — ONDANSETRON 2 MG/ML
4 INJECTION INTRAMUSCULAR; INTRAVENOUS EVERY 6 HOURS PRN
Status: DISCONTINUED | OUTPATIENT
Start: 2023-09-11 | End: 2023-09-12

## 2023-09-11 RX ORDER — MAGNESIUM SULFATE IN WATER 40 MG/ML
2000 INJECTION, SOLUTION INTRAVENOUS PRN
Status: DISCONTINUED | OUTPATIENT
Start: 2023-09-11 | End: 2023-09-12

## 2023-09-11 RX ORDER — ACETAMINOPHEN 650 MG/1
650 SUPPOSITORY RECTAL EVERY 6 HOURS PRN
Status: DISCONTINUED | OUTPATIENT
Start: 2023-09-11 | End: 2023-09-12

## 2023-09-11 RX ORDER — SODIUM CHLORIDE 0.9 % (FLUSH) 0.9 %
5-40 SYRINGE (ML) INJECTION EVERY 12 HOURS SCHEDULED
Status: DISCONTINUED | OUTPATIENT
Start: 2023-09-11 | End: 2023-09-12 | Stop reason: HOSPADM

## 2023-09-11 RX ORDER — DEXTROSE MONOHYDRATE 25 G/50ML
INJECTION, SOLUTION INTRAVENOUS
Status: COMPLETED
Start: 2023-09-11 | End: 2023-09-11

## 2023-09-11 RX ORDER — CALCIUM GLUCONATE 20 MG/ML
2000 INJECTION, SOLUTION INTRAVENOUS ONCE
Status: COMPLETED | OUTPATIENT
Start: 2023-09-11 | End: 2023-09-11

## 2023-09-11 RX ORDER — PHENYLEPHRINE HCL IN 0.9% NACL 50MG/250ML
10-300 PLASTIC BAG, INJECTION (ML) INTRAVENOUS CONTINUOUS
Status: DISCONTINUED | OUTPATIENT
Start: 2023-09-11 | End: 2023-09-11

## 2023-09-11 RX ORDER — DOPAMINE HYDROCHLORIDE 160 MG/100ML
1-20 INJECTION, SOLUTION INTRAVENOUS CONTINUOUS
Status: DISCONTINUED | OUTPATIENT
Start: 2023-09-11 | End: 2023-09-12

## 2023-09-11 RX ORDER — NOREPINEPHRINE BITARTRATE 0.06 MG/ML
1-100 INJECTION, SOLUTION INTRAVENOUS CONTINUOUS
Status: DISCONTINUED | OUTPATIENT
Start: 2023-09-11 | End: 2023-09-11 | Stop reason: SDUPTHER

## 2023-09-11 RX ADMIN — SODIUM BICARBONATE 50 MEQ: 84 INJECTION INTRAVENOUS at 13:09

## 2023-09-11 RX ADMIN — IOPAMIDOL 75 ML: 755 INJECTION, SOLUTION INTRAVENOUS at 05:50

## 2023-09-11 RX ADMIN — DEXTROSE MONOHYDRATE 125 ML: 100 INJECTION, SOLUTION INTRAVENOUS at 18:13

## 2023-09-11 RX ADMIN — SODIUM BICARBONATE 150 MEQ: 84 INJECTION, SOLUTION INTRAVENOUS at 08:06

## 2023-09-11 RX ADMIN — SODIUM CHLORIDE: 9 INJECTION, SOLUTION INTRAVENOUS at 17:49

## 2023-09-11 RX ADMIN — PIPERACILLIN AND TAZOBACTAM 3375 MG: 3; .375 INJECTION, POWDER, LYOPHILIZED, FOR SOLUTION INTRAVENOUS at 12:19

## 2023-09-11 RX ADMIN — DOPAMINE HYDROCHLORIDE 5 MCG/KG/MIN: 160 INJECTION, SOLUTION INTRAVENOUS at 18:57

## 2023-09-11 RX ADMIN — Medication 10 MCG/MIN: at 00:42

## 2023-09-11 RX ADMIN — Medication 100 MCG/MIN: at 13:05

## 2023-09-11 RX ADMIN — SODIUM BICARBONATE 50 MEQ: 84 INJECTION INTRAVENOUS at 13:08

## 2023-09-11 RX ADMIN — IOPAMIDOL 75 ML: 755 INJECTION, SOLUTION INTRAVENOUS at 02:16

## 2023-09-11 RX ADMIN — NOREPINEPHRINE BITARTRATE 100 MCG/MIN: 1 INJECTION, SOLUTION, CONCENTRATE INTRAVENOUS at 23:11

## 2023-09-11 RX ADMIN — Medication 25 MCG/HR: at 01:33

## 2023-09-11 RX ADMIN — DOPAMINE HYDROCHLORIDE 20 MCG/KG/MIN: 160 INJECTION, SOLUTION INTRAVENOUS at 23:15

## 2023-09-11 RX ADMIN — Medication 300 MCG/MIN: at 09:52

## 2023-09-11 RX ADMIN — EPINEPHRINE 1 MCG/MIN: 1 INJECTION INTRAMUSCULAR; INTRAVENOUS; SUBCUTANEOUS at 10:32

## 2023-09-11 RX ADMIN — PHYTONADIONE 10 MG: 10 INJECTION, EMULSION INTRAMUSCULAR; INTRAVENOUS; SUBCUTANEOUS at 08:53

## 2023-09-11 RX ADMIN — POTASSIUM CHLORIDE 20 MEQ: 29.8 INJECTION, SOLUTION INTRAVENOUS at 18:15

## 2023-09-11 RX ADMIN — Medication 2 MG/HR: at 01:35

## 2023-09-11 RX ADMIN — SODIUM BICARBONATE 50 MEQ: 84 INJECTION INTRAVENOUS at 13:15

## 2023-09-11 RX ADMIN — Medication 125 MCG/HR: at 14:28

## 2023-09-11 RX ADMIN — DEXTROSE MONOHYDRATE 125 ML: 100 INJECTION, SOLUTION INTRAVENOUS at 11:42

## 2023-09-11 RX ADMIN — VANCOMYCIN HYDROCHLORIDE 1500 MG: 1.5 INJECTION, POWDER, LYOPHILIZED, FOR SOLUTION INTRAVENOUS at 11:36

## 2023-09-11 RX ADMIN — Medication 85 MCG/MIN: at 06:58

## 2023-09-11 RX ADMIN — HYDROCORTISONE SODIUM SUCCINATE 100 MG: 100 INJECTION, POWDER, FOR SOLUTION INTRAMUSCULAR; INTRAVENOUS at 18:57

## 2023-09-11 RX ADMIN — SODIUM CHLORIDE, PRESERVATIVE FREE 10 ML: 5 INJECTION INTRAVENOUS at 11:54

## 2023-09-11 RX ADMIN — DEXTROSE MONOHYDRATE: 100 INJECTION, SOLUTION INTRAVENOUS at 04:54

## 2023-09-11 RX ADMIN — MAGNESIUM SULFATE HEPTAHYDRATE 2000 MG: 40 INJECTION, SOLUTION INTRAVENOUS at 17:51

## 2023-09-11 RX ADMIN — Medication 300 MCG/MIN: at 12:58

## 2023-09-11 RX ADMIN — SODIUM BICARBONATE 150 MEQ: 84 INJECTION, SOLUTION INTRAVENOUS at 10:06

## 2023-09-11 RX ADMIN — Medication 100 MCG/MIN: at 09:55

## 2023-09-11 RX ADMIN — DEXTROSE MONOHYDRATE 125 ML: 100 INJECTION, SOLUTION INTRAVENOUS at 08:30

## 2023-09-11 RX ADMIN — SODIUM CHLORIDE: 9 INJECTION, SOLUTION INTRAVENOUS at 08:50

## 2023-09-11 RX ADMIN — SODIUM BICARBONATE 50 MEQ: 84 INJECTION, SOLUTION INTRAVENOUS at 14:53

## 2023-09-11 RX ADMIN — SODIUM BICARBONATE: 84 INJECTION, SOLUTION INTRAVENOUS at 10:28

## 2023-09-11 RX ADMIN — Medication 100 MCG/MIN: at 16:21

## 2023-09-11 RX ADMIN — SODIUM BICARBONATE: 84 INJECTION, SOLUTION INTRAVENOUS at 18:26

## 2023-09-11 RX ADMIN — CALCIUM GLUCONATE 2000 MG: 20 INJECTION, SOLUTION INTRAVENOUS at 17:44

## 2023-09-11 RX ADMIN — PHENYLEPHRINE HYDROCHLORIDE 300 MCG/MIN: 10 INJECTION INTRAVENOUS at 19:14

## 2023-09-11 RX ADMIN — Medication 300 MCG/MIN: at 16:08

## 2023-09-11 RX ADMIN — HYDROCORTISONE SODIUM SUCCINATE 100 MG: 100 INJECTION, POWDER, FOR SOLUTION INTRAMUSCULAR; INTRAVENOUS at 12:34

## 2023-09-11 RX ADMIN — EPINEPHRINE 30 MCG/MIN: 1 INJECTION INTRAMUSCULAR; INTRAVENOUS; SUBCUTANEOUS at 14:35

## 2023-09-11 RX ADMIN — VASOPRESSIN 0.04 UNITS/MIN: 20 INJECTION INTRAVENOUS at 07:14

## 2023-09-11 RX ADMIN — VASOPRESSIN 0.04 UNITS/MIN: 20 INJECTION INTRAVENOUS at 15:48

## 2023-09-11 RX ADMIN — EPINEPHRINE 30 MCG/MIN: 1 INJECTION INTRAMUSCULAR; INTRAVENOUS; SUBCUTANEOUS at 18:03

## 2023-09-11 RX ADMIN — SODIUM CHLORIDE 500 ML: 9 INJECTION, SOLUTION INTRAVENOUS at 18:30

## 2023-09-11 RX ADMIN — DEXTROSE MONOHYDRATE 125 ML: 100 INJECTION, SOLUTION INTRAVENOUS at 10:02

## 2023-09-11 RX ADMIN — NOREPINEPHRINE BITARTRATE 100 MCG/MIN: 1 INJECTION, SOLUTION, CONCENTRATE INTRAVENOUS at 18:08

## 2023-09-11 RX ADMIN — SODIUM CHLORIDE 1000 ML: 9 INJECTION, SOLUTION INTRAVENOUS at 09:53

## 2023-09-11 RX ADMIN — DEXTROSE MONOHYDRATE 125 ML: 100 INJECTION, SOLUTION INTRAVENOUS at 14:59

## 2023-09-11 RX ADMIN — EPINEPHRINE 30 MCG/MIN: 1 INJECTION INTRAMUSCULAR; INTRAVENOUS; SUBCUTANEOUS at 23:46

## 2023-09-11 RX ADMIN — POTASSIUM CHLORIDE 20 MEQ: 29.8 INJECTION, SOLUTION INTRAVENOUS at 12:51

## 2023-09-11 RX ADMIN — SODIUM BICARBONATE 50 MEQ: 84 INJECTION, SOLUTION INTRAVENOUS at 23:28

## 2023-09-11 RX ADMIN — SODIUM BICARBONATE 50 MEQ: 84 INJECTION, SOLUTION INTRAVENOUS at 23:21

## 2023-09-11 RX ADMIN — SODIUM CHLORIDE, PRESERVATIVE FREE 40 MG: 5 INJECTION INTRAVENOUS at 11:47

## 2023-09-11 RX ADMIN — MEROPENEM 500 MG: 500 INJECTION, POWDER, FOR SOLUTION INTRAVENOUS at 19:58

## 2023-09-11 RX ADMIN — SODIUM BICARBONATE 50 MEQ: 84 INJECTION, SOLUTION INTRAVENOUS at 17:59

## 2023-09-11 RX ADMIN — Medication 300 MCG/MIN: at 07:00

## 2023-09-11 RX ADMIN — SODIUM CHLORIDE, PRESERVATIVE FREE 10 ML: 5 INJECTION INTRAVENOUS at 21:45

## 2023-09-11 RX ADMIN — SODIUM BICARBONATE: 84 INJECTION, SOLUTION INTRAVENOUS at 07:14

## 2023-09-11 RX ADMIN — SODIUM CHLORIDE 1000 ML: 9 INJECTION, SOLUTION INTRAVENOUS at 13:40

## 2023-09-11 RX ADMIN — MEROPENEM 2000 MG: 1 INJECTION, POWDER, FOR SOLUTION INTRAVENOUS at 14:25

## 2023-09-11 RX ADMIN — DEXTROSE MONOHYDRATE 25 G: 25 INJECTION, SOLUTION INTRAVENOUS at 04:44

## 2023-09-11 RX ADMIN — DEXTROSE MONOHYDRATE 25 G: 25 INJECTION, SOLUTION INTRAVENOUS at 02:25

## 2023-09-11 RX ADMIN — Medication 150 MEQ: at 08:06

## 2023-09-11 RX ADMIN — Medication 50 MEQ: at 23:21

## 2023-09-11 RX ADMIN — SODIUM CHLORIDE 500 ML: 9 INJECTION, SOLUTION INTRAVENOUS at 20:25

## 2023-09-11 RX ADMIN — SODIUM CHLORIDE 500 ML: 9 INJECTION, SOLUTION INTRAVENOUS at 14:58

## 2023-09-11 RX ADMIN — SODIUM CHLORIDE: 9 INJECTION, SOLUTION INTRAVENOUS at 12:13

## 2023-09-11 ASSESSMENT — PULMONARY FUNCTION TESTS
PIF_VALUE: 20
PIF_VALUE: 20
PIF_VALUE: 11
PIF_VALUE: 21
PIF_VALUE: 17
PIF_VALUE: 20
PIF_VALUE: 26

## 2023-09-11 NOTE — FLOWSHEET NOTE
Pt arrived to MICU from ED. Monitoring devices applied. Medications titrated per MAR. See orders placed.      Electronically signed by Kirill Rivera RN on 9/11/2023 at 7:54 AM

## 2023-09-11 NOTE — SIGNIFICANT EVENT
I had a long discussion with the patient's family in person. Patient's current clinical condition, laboratory and radiographic findings as well as recommendations of physicians consulted on the case were discussed with the patient's family in detail in simple [place]. All questions and concerns of the family were addressed, and appropriate emotional support was provided. After understanding patient's current medical condition, family decided that they wanted to continue the ongoing treatment but in case patient's heart stops (cardiac arrest), they do not want any chest compressions. They requested that if such a condition arises, the patient should be made comfortable and nature should be allowed to take its course. They asked that patient's code status should be changed to [facility]. Will honor family's wishes, and will change patient's code status to [facility] with intubation.       Eryn Muller MD  Pager: [phone]  Department of Internal Medicine,  [address] [place] [address])             9/11/2023, 7:37 PM

## 2023-09-11 NOTE — ED NOTES
Dr Maverick Witt & Dr Peterson Lorenzana preparing for CV line insertion     Carmenza Ayala, RN  09/11/23 7250
Levophed startedby 10mcg/min     Liliam Arthur RN  09/11/23 5174
OGT Fr 14  inserted by Ken Crowder at the [address] [address]  N, RN  09/11/23 123 [address] Selene Avila, RN  09/11/23 0110
Pt CT done  Pt to 46[address] Alex Faye, RN  09/11/23 7760
Pt attached to cardiac monitor & [employer]     Jose Wing RN  09/11/23 0102
Pt condition reported to Evert Calderon Dr, RN  09/11/23 8850
Pt to [id number] [facility] Elio Phillips, ANSON  09/11/23 737
Pt to ED13 via stretcher accompanied by LS3 due to Cardiac Arrest.  Hx of Drug use. LS3 did CPR for 10 mins, gave 1mg of Epi. Pt got ROSC. LS3 gave  50 fent, 20 of etomidate, Ariel for intubation  Upon arrival pt has 7.0 ETtube in place 22cm at the lip.      Curt Stark RN  09/11/23 ANSON Nichols  09/11/23 7480
Saline Bolus started per  Dr Benitez Lebron, RN  09/11/23 5946
Saline bolus started     Jose D Marques RN  09/11/23 0045
Writer called lab to run pt's urine drug test     Dangelo Rubio RN  09/11/23 9937
Abnormal; Notable for the following components:    pH, James 7.063 (*)     pO2, James 65.4 (*)     HCO3, Venous 13.7 (*)     Negative Base Excess, James 16.3 (*)     All other components within normal limits   UREA N (POC) - Abnormal; Notable for the following components:    POC BUN 43 (*)     All other components within normal limits   LACTIC ACID,POINT OF CARE - Abnormal; Notable for the following components:    POC Lactic Acid 8.7 (*)     All other components within normal limits   POCT GLUCOSE - Abnormal; Notable for the following components:    POC Glucose 52 (*)     All other components within normal limits   CULTURE, BLOOD 1   CULTURE, BLOOD 1   CULTURE, URINE   HEPATIC FUNCTION PANEL   HGB/HCT   CALCIUM, IONIC (POC)   BASIC METABOLIC PANEL   URINALYSIS WITH MICROSCOPIC   LIPASE   LACTATE, SEPSIS   URINE DRUG SCREEN   TOX SCR, BLD, ED   CREATININE W/GFR POINT OF CARE       Electronically signed by Neida Warren RN on 9/11/2023 at 1:46 AM      Neida Warren RN  09/11/23 0148

## 2023-09-11 NOTE — ED PROVIDER NOTES
[facility] ED  Emergency Department Encounter  Emergency Medicine Resident     Pt Ally Kinsey  MRN: [id number]  [facility] 1980  Date of evaluation: 23  PCP:  KETTY Sun NP  Note Started: 1:18 AM EDT      CHIEF COMPLAINT       Chief Complaint   Patient presents with    Cardiac Arrest       HISTORY OF PRESENT ILLNESS  (Location/Symptom, Timing/Onset, Context/Setting, Quality, Duration, Modifying Factors, Severity.)      Skip Santos is a 43 y.o. female who presents with cardiac arrest.  Patient was found down, EMS was called. Upon EMS arrival patient was unresponsive, pulseless, found to be PEA. CPR was initiated patient received 1 round of epinephrine with ROSC. Patient was intubated with a 7.0 ET tube and transfer to [address] [place] [address]. According EMS patient is a presumed drug overdose, house was extremely dirty,. Drug paraphernalia around. Family arrived to the emergency department. States that patient has had difficulty walking due to swelling of the legs. Patient also was recently diagnosed with a pulmonary embolism, on Eliquis. Patient was using the bedpan when  went to check on her approximately 5 minutes later she had slid off the bed and was unresponsive. PAST MEDICAL / SURGICAL / SOCIAL / FAMILY HISTORY      has a past medical history of Anemia requiring transfusions, Anxiety, Cervical neuritis, Depression, Frequent UTI, Susan filter in place, Meningioma ([address]), Migraines, Scoliosis, Weakness of left arm, and Wears dentures. has a past surgical history that includes Adenoidectomy; Tonsillectomy; Ankle surgery; Gastric bypass surgery; Dilation and curettage of uterus;  section; Tubal ligation; Dental surgery; and Cervical spine surgery (2016).       Social History     Socioeconomic History    Marital status:      Spouse name: Not on file    Number of children: 2    Years of education: Not on file    Highest

## 2023-09-11 NOTE — H&P
Critical Care - History and Physical Examination    Patient's name:  Roberto Danielle  Medical Record Number: [id number]  Patient's account/billing number: [de-identified]  Patient's YOB: 1980  Age: 43 y.o. Date of Admission: 9/11/2023 12:35 AM  Reason of ICU admission:   Date of History and Physical Examination: 9/11/2023      Primary Care Physician: KETTY Hobbs NP  Attending Physician: Dr Fadia Charles    Code Status: Prior    Chief complaint: Cardiac arrest    Reason for ICU admission: Cardiac arrest      History Of Present Illness:   History was obtained from chart review. Roberto Danielle is a 43 y.o. female with past medical history of IV drug abuse but according to the ED team has been clean for the past few months, hypoalbuminemia, anasarca, recent PE on Eliquis, hypothyroidism, ADHD    Home medications include Xanax, Eliquis, bupropion, buspirone, citalopram, Lasix, hydroxyzine, levothyroxine, propranolol, Adderall    Presented to the ED via EMS after cardiac arrest, according to the ED team patient's family reported that patient has been having difficulty walking due to pedal edema, last night patient was using bedpan and has been went on to check and she was sliding off the bed and went unresponsive, EMS was called EMS was there in about 10 to 15 minutes, found the patient to be in PEA cardiac arrest, started CPR 1 round, 1 dose of epinephrine, achieved ROSC, intubated in the field with 7 sized ET tube. EMS reported to the ED team that the house was in a very poor living conditions, seemed like a drug house.     On arrival to the ED patient was tachycardic in the 130s, hypotensive with systolics in the 44K, received fluid bolus, started on Levophed  Initial lab work showed sodium 130, potassium 3.4, chloride 101, bicarb 11, BUN 43, creatinine 1.3, anion gap 18, glucose 45  Lactic acid 9  Troponin 32 > 92  Albumin 1.4, AST 45, ALT 42, total protein 2.7  WBC 4.1, hemoglobin 11.4, INR

## 2023-09-12 ENCOUNTER — APPOINTMENT (OUTPATIENT)
Dept: GENERAL RADIOLOGY | Age: 43
DRG: 720 | End: 2023-09-12
Payer: MEDICAID

## 2023-09-12 VITALS
BODY MASS INDEX: 33.56 KG/M2 | OXYGEN SATURATION: 25 % | TEMPERATURE: 94.1 F | SYSTOLIC BLOOD PRESSURE: 106 MMHG | WEIGHT: 214.29 LBS | DIASTOLIC BLOOD PRESSURE: 66 MMHG

## 2023-09-12 PROBLEM — D61.818 PANCYTOPENIA (HCC): Status: ACTIVE | Noted: 2023-09-12

## 2023-09-12 PROBLEM — R65.21 E. COLI SEPTIC SHOCK (HCC): Status: ACTIVE | Noted: 2023-09-12

## 2023-09-12 PROBLEM — Z71.89 ACP (ADVANCE CARE PLANNING): Status: ACTIVE | Noted: 2023-09-12

## 2023-09-12 PROBLEM — D65 DIC (DISSEMINATED INTRAVASCULAR COAGULATION) (HCC): Status: ACTIVE | Noted: 2023-09-12

## 2023-09-12 PROBLEM — D50.0 BLOOD LOSS ANEMIA: Status: ACTIVE | Noted: 2023-09-12

## 2023-09-12 PROBLEM — A41.51 E. COLI SEPTIC SHOCK (HCC): Status: ACTIVE | Noted: 2023-09-12

## 2023-09-12 LAB
ALBUMIN SERPL-MCNC: 0.8 G/DL (ref 3.5–5.2)
ALBUMIN/GLOB SERPL: 1.3 {RATIO} (ref 1–2.5)
ALLEN TEST: ABNORMAL
ALP SERPL-CCNC: 41 U/L (ref 35–104)
ALT SERPL-CCNC: 124 U/L (ref 5–33)
ANION GAP SERPL CALCULATED.3IONS-SCNC: 41 MMOL/L (ref 9–17)
AST SERPL-CCNC: 317 U/L
BASOPHILS # BLD: 0 K/UL (ref 0–0.2)
BASOPHILS NFR BLD: 0 % (ref 0–2)
BILIRUB DIRECT SERPL-MCNC: 0.2 MG/DL
BILIRUB INDIRECT SERPL-MCNC: 0.1 MG/DL (ref 0–1)
BILIRUB SERPL-MCNC: 0.3 MG/DL (ref 0.3–1.2)
BLOOD BANK BLOOD PRODUCT EXPIRATION DATE: NORMAL
BLOOD BANK DISPENSE STATUS: NORMAL
BLOOD BANK ISBT PRODUCT BLOOD TYPE: 1700
BLOOD BANK ISBT PRODUCT BLOOD TYPE: 7300
BLOOD BANK PRODUCT CODE: NORMAL
BLOOD BANK UNIT TYPE AND RH: NORMAL
BPU ID: NORMAL
BUN SERPL-MCNC: 29 MG/DL (ref 6–20)
CA-I BLD-SCNC: 0.87 MMOL/L (ref 1.13–1.33)
CALCIUM SERPL-MCNC: 6 MG/DL (ref 8.6–10.4)
CELLS COUNTED: 50
CHLORIDE SERPL-SCNC: 93 MMOL/L (ref 98–107)
CO2 SERPL-SCNC: 6 MMOL/L (ref 20–31)
COMPONENT: NORMAL
CORTIS SERPL-MCNC: 130 UG/DL (ref 2.7–18.4)
CREAT SERPL-MCNC: 1.3 MG/DL (ref 0.5–0.9)
EOSINOPHIL # BLD: 0.04 K/UL (ref 0–0.4)
EOSINOPHILS RELATIVE PERCENT: 2 % (ref 1–4)
ERYTHROCYTE [DISTWIDTH] IN BLOOD BY AUTOMATED COUNT: 15.2 % (ref 11.8–14.4)
ERYTHROCYTE [DISTWIDTH] IN BLOOD BY AUTOMATED COUNT: 18.5 % (ref 11.8–14.4)
FIBRINOGEN, FUNCTIONAL TEG: 4.4 MM (ref 15–32)
FIBRINOGEN, FUNCTIONAL TEG: <4 MM (ref 15–32)
FIBRINOGEN, FUNCTIONAL TEG: <4 MM (ref 15–32)
FIO2: 40
GFR SERPL CREATININE-BSD FRML MDRD: 53 ML/MIN/1.73M2
GLUCOSE BLD-MCNC: 12 MG/DL (ref 65–105)
GLUCOSE BLD-MCNC: 81 MG/DL (ref 65–105)
GLUCOSE BLD-MCNC: 89 MG/DL (ref 74–100)
GLUCOSE BLD-MCNC: 92 MG/DL (ref 65–105)
GLUCOSE SERPL-MCNC: 86 MG/DL (ref 70–99)
HCT VFR BLD AUTO: 19.8 % (ref 36.3–47.1)
HCT VFR BLD AUTO: 8 % (ref 36.3–47.1)
HGB BLD-MCNC: 2.2 G/DL (ref 11.9–15.1)
HGB BLD-MCNC: 5.7 G/DL (ref 11.9–15.1)
IMM GRANULOCYTES # BLD AUTO: 0.13 K/UL (ref 0–0.3)
IMM GRANULOCYTES NFR BLD: 6 %
INR PPP: 10.4
INR PPP: 4.6
LACTIC ACID, WHOLE BLOOD: >30 MMOL/L (ref 0.7–2.1)
LY30 (LYSIS) TEG: 0 % (ref 0–2.6)
LY30 (LYSIS) TEG: 0 % (ref 0–2.6)
LY30 (LYSIS) TEG: ABNORMAL % (ref 0–2.6)
LYMPHOCYTES NFR BLD: 0.84 K/UL (ref 1–4.8)
LYMPHOCYTES RELATIVE PERCENT: 38 % (ref 24–44)
MA(MAX CLOT) RAPID TEG: <40 MM (ref 52–70)
MAGNESIUM SERPL-MCNC: 2.1 MG/DL (ref 1.6–2.6)
MCH RBC QN AUTO: 29.3 PG (ref 25.2–33.5)
MCH RBC QN AUTO: 29.4 PG (ref 25.2–33.5)
MCHC RBC AUTO-ENTMCNC: 27.5 G/DL (ref 28.4–34.8)
MCHC RBC AUTO-ENTMCNC: 28.8 G/DL (ref 28.4–34.8)
MCV RBC AUTO: 102.1 FL (ref 82.6–102.9)
MCV RBC AUTO: 106.7 FL (ref 82.6–102.9)
MODE: ABNORMAL
MONOCYTES NFR BLD: 0.04 K/UL (ref 0.1–0.8)
MONOCYTES NFR BLD: 2 % (ref 1–7)
MORPHOLOGY: ABNORMAL
NEGATIVE BASE EXCESS, ART: 19.2 MMOL/L (ref 0–2)
NEUTROPHILS NFR BLD: 52 % (ref 36–66)
NEUTS SEG NFR BLD: 1.15 K/UL (ref 1.8–7.7)
NRBC BLD-RTO: 4 PER 100 WBC
NRBC BLD-RTO: 8.7 PER 100 WBC
NUCLEATED RED BLOOD CELLS: 8 PER 100 WBC
O2 DELIVERY DEVICE: ABNORMAL
PARTIAL THROMBOPLASTIN TIME: 88.8 SEC (ref 23–36.5)
PATIENT TEMP: 34.8
PLATELET # BLD AUTO: ABNORMAL K/UL (ref 138–453)
PLATELET # BLD AUTO: ABNORMAL K/UL (ref 138–453)
PLATELET, FLUORESCENCE: 23 K/UL (ref 138–453)
PLATELET, FLUORESCENCE: 27 K/UL (ref 138–453)
PLATELETS.RETICULATED NFR BLD AUTO: 2.2 % (ref 1.1–10.3)
PLATELETS.RETICULATED NFR BLD AUTO: 2.9 % (ref 1.1–10.3)
PMV BLD AUTO: 10.2 FL (ref 8.1–13.5)
POC HCO3: 8.8 MMOL/L (ref 21–28)
POC O2 SATURATION: 99.1 % (ref 94–98)
POC PCO2 TEMP: 32.2 MM HG
POC PCO2: 35.5 MM HG (ref 35–48)
POC PH TEMP: 7.03
POC PH: 7 (ref 7.35–7.45)
POC PO2 TEMP: 191.6 MM HG
POC PO2: 202.5 MM HG (ref 83–108)
POTASSIUM SERPL-SCNC: 5.9 MMOL/L (ref 3.7–5.3)
PROT SERPL-MCNC: 1.4 G/DL (ref 6.4–8.3)
PROTHROMBIN TIME: 42.7 SEC (ref 11.7–14.9)
PROTHROMBIN TIME: 80.7 SEC (ref 11.7–14.9)
RBC # BLD AUTO: 0.75 M/UL (ref 3.95–5.11)
RBC # BLD AUTO: 1.94 M/UL (ref 3.95–5.11)
RBC # BLD: ABNORMAL 10*6/UL
REACTION TIME TEG: >17 MIN (ref 4.6–9.1)
SAMPLE SITE: ABNORMAL
SODIUM SERPL-SCNC: 140 MMOL/L (ref 135–144)
T4 FREE SERPL-MCNC: 1.3 NG/DL (ref 0.9–1.7)
TRANSFUSION STATUS: NORMAL
TROPONIN I SERPL HS-MCNC: 73 NG/L (ref 0–14)
UNIT DIVISION: 0
UNIT ISSUE DATE/TIME: NORMAL
WBC OTHER # BLD: 1.5 K/UL (ref 3.5–11.3)
WBC OTHER # BLD: 2.2 K/UL (ref 3.5–11.3)

## 2023-09-12 PROCEDURE — 86900 BLOOD TYPING SEROLOGIC ABO: CPT

## 2023-09-12 PROCEDURE — 2500000003 HC RX 250 WO HCPCS

## 2023-09-12 PROCEDURE — 82803 BLOOD GASES ANY COMBINATION: CPT

## 2023-09-12 PROCEDURE — P9017 PLASMA 1 DONOR FRZ W/IN 8 HR: HCPCS

## 2023-09-12 PROCEDURE — P9040 RBC LEUKOREDUCED IRRADIATED: HCPCS

## 2023-09-12 PROCEDURE — 6360000002 HC RX W HCPCS

## 2023-09-12 PROCEDURE — 85576 BLOOD PLATELET AGGREGATION: CPT

## 2023-09-12 PROCEDURE — 85025 COMPLETE CBC W/AUTO DIFF WBC: CPT

## 2023-09-12 PROCEDURE — 85610 PROTHROMBIN TIME: CPT

## 2023-09-12 PROCEDURE — 84439 ASSAY OF FREE THYROXINE: CPT

## 2023-09-12 PROCEDURE — 85055 RETICULATED PLATELET ASSAY: CPT

## 2023-09-12 PROCEDURE — 71045 X-RAY EXAM CHEST 1 VIEW: CPT

## 2023-09-12 PROCEDURE — 85027 COMPLETE CBC AUTOMATED: CPT

## 2023-09-12 PROCEDURE — 83735 ASSAY OF MAGNESIUM: CPT

## 2023-09-12 PROCEDURE — 80048 BASIC METABOLIC PNL TOTAL CA: CPT

## 2023-09-12 PROCEDURE — 94003 VENT MGMT INPAT SUBQ DAY: CPT

## 2023-09-12 PROCEDURE — 84484 ASSAY OF TROPONIN QUANT: CPT

## 2023-09-12 PROCEDURE — 80076 HEPATIC FUNCTION PANEL: CPT

## 2023-09-12 PROCEDURE — 37799 UNLISTED PX VASCULAR SURGERY: CPT

## 2023-09-12 PROCEDURE — 36430 TRANSFUSION BLD/BLD COMPNT: CPT

## 2023-09-12 PROCEDURE — 2580000003 HC RX 258

## 2023-09-12 PROCEDURE — P9073 PLATELETS PHERESIS PATH REDU: HCPCS

## 2023-09-12 PROCEDURE — P9016 RBC LEUKOCYTES REDUCED: HCPCS

## 2023-09-12 PROCEDURE — 86927 PLASMA FRESH FROZEN: CPT

## 2023-09-12 PROCEDURE — 82533 TOTAL CORTISOL: CPT

## 2023-09-12 PROCEDURE — 85730 THROMBOPLASTIN TIME PARTIAL: CPT

## 2023-09-12 PROCEDURE — 83605 ASSAY OF LACTIC ACID: CPT

## 2023-09-12 PROCEDURE — 74018 RADEX ABDOMEN 1 VIEW: CPT

## 2023-09-12 PROCEDURE — 85390 FIBRINOLYSINS SCREEN I&R: CPT

## 2023-09-12 PROCEDURE — 85384 FIBRINOGEN ACTIVITY: CPT

## 2023-09-12 PROCEDURE — 85347 COAGULATION TIME ACTIVATED: CPT

## 2023-09-12 PROCEDURE — 82330 ASSAY OF CALCIUM: CPT

## 2023-09-12 PROCEDURE — 6360000002 HC RX W HCPCS: Performed by: INTERNAL MEDICINE

## 2023-09-12 PROCEDURE — 99222 1ST HOSP IP/OBS MODERATE 55: CPT | Performed by: INTERNAL MEDICINE

## 2023-09-12 PROCEDURE — 99291 CRITICAL CARE FIRST HOUR: CPT | Performed by: INTERNAL MEDICINE

## 2023-09-12 RX ORDER — FENTANYL CITRATE 50 UG/ML
25 INJECTION, SOLUTION INTRAMUSCULAR; INTRAVENOUS EVERY 30 MIN PRN
Status: DISCONTINUED | OUTPATIENT
Start: 2023-09-12 | End: 2023-09-12

## 2023-09-12 RX ORDER — LIOTHYRONINE SODIUM 10 UG/ML
5 INJECTION, SOLUTION INTRAVENOUS ONCE
Status: COMPLETED | OUTPATIENT
Start: 2023-09-12 | End: 2023-09-12

## 2023-09-12 RX ORDER — SODIUM CHLORIDE 9 MG/ML
INJECTION, SOLUTION INTRAVENOUS PRN
Status: DISCONTINUED | OUTPATIENT
Start: 2023-09-12 | End: 2023-09-12

## 2023-09-12 RX ORDER — LEVOTHYROXINE SODIUM 0.03 MG/1
25 TABLET ORAL DAILY
Status: DISCONTINUED | OUTPATIENT
Start: 2023-09-12 | End: 2023-09-12

## 2023-09-12 RX ORDER — LOPERAMIDE HYDROCHLORIDE 2 MG/1
2 CAPSULE ORAL PRN
Status: DISCONTINUED | OUTPATIENT
Start: 2023-09-12 | End: 2023-09-12

## 2023-09-12 RX ORDER — FENTANYL CITRATE 50 UG/ML
25 INJECTION, SOLUTION INTRAMUSCULAR; INTRAVENOUS
Status: DISCONTINUED | OUTPATIENT
Start: 2023-09-12 | End: 2023-09-12 | Stop reason: HOSPADM

## 2023-09-12 RX ORDER — 0.9 % SODIUM CHLORIDE 0.9 %
250 INTRAVENOUS SOLUTION INTRAVENOUS ONCE
Status: COMPLETED | OUTPATIENT
Start: 2023-09-12 | End: 2023-09-12

## 2023-09-12 RX ORDER — CALCIUM GLUCONATE 20 MG/ML
2000 INJECTION, SOLUTION INTRAVENOUS ONCE
Status: COMPLETED | OUTPATIENT
Start: 2023-09-12 | End: 2023-09-12

## 2023-09-12 RX ORDER — LEVOTHYROXINE SODIUM ANHYDROUS 100 UG/5ML
200 INJECTION, POWDER, LYOPHILIZED, FOR SOLUTION INTRAVENOUS ONCE
Status: COMPLETED | OUTPATIENT
Start: 2023-09-12 | End: 2023-09-12

## 2023-09-12 RX ORDER — GLYCOPYRROLATE 0.2 MG/ML
0.2 INJECTION INTRAMUSCULAR; INTRAVENOUS EVERY 4 HOURS PRN
Status: DISCONTINUED | OUTPATIENT
Start: 2023-09-12 | End: 2023-09-12 | Stop reason: HOSPADM

## 2023-09-12 RX ORDER — ONDANSETRON 2 MG/ML
4 INJECTION INTRAMUSCULAR; INTRAVENOUS EVERY 6 HOURS PRN
Status: DISCONTINUED | OUTPATIENT
Start: 2023-09-12 | End: 2023-09-12

## 2023-09-12 RX ORDER — LORAZEPAM 2 MG/ML
1 INJECTION INTRAMUSCULAR EVERY 30 MIN PRN
Status: DISCONTINUED | OUTPATIENT
Start: 2023-09-12 | End: 2023-09-12 | Stop reason: HOSPADM

## 2023-09-12 RX ADMIN — SODIUM BICARBONATE 50 MEQ: 84 INJECTION, SOLUTION INTRAVENOUS at 06:03

## 2023-09-12 RX ADMIN — PHENYLEPHRINE HYDROCHLORIDE 300 MCG/MIN: 10 INJECTION INTRAVENOUS at 00:56

## 2023-09-12 RX ADMIN — LORAZEPAM 1 MG: 2 INJECTION INTRAMUSCULAR; INTRAVENOUS at 10:01

## 2023-09-12 RX ADMIN — LEVOTHYROXINE SODIUM ANHYDROUS 200 MCG: 100 INJECTION, POWDER, LYOPHILIZED, FOR SOLUTION INTRAVENOUS at 02:01

## 2023-09-12 RX ADMIN — LIOTHYRONINE SODIUM 5 MCG: 10 INJECTION, SOLUTION INTRAVENOUS at 02:08

## 2023-09-12 RX ADMIN — SODIUM CHLORIDE 250 ML: 9 INJECTION, SOLUTION INTRAVENOUS at 06:08

## 2023-09-12 RX ADMIN — SODIUM BICARBONATE 50 MEQ: 84 INJECTION, SOLUTION INTRAVENOUS at 01:31

## 2023-09-12 RX ADMIN — DOPAMINE HYDROCHLORIDE 20 MCG/KG/MIN: 160 INJECTION, SOLUTION INTRAVENOUS at 03:06

## 2023-09-12 RX ADMIN — EPINEPHRINE 30 MCG/MIN: 1 INJECTION INTRAMUSCULAR; INTRAVENOUS; SUBCUTANEOUS at 05:49

## 2023-09-12 RX ADMIN — SODIUM BICARBONATE 50 MEQ: 84 INJECTION, SOLUTION INTRAVENOUS at 05:44

## 2023-09-12 RX ADMIN — SODIUM BICARBONATE 50 MEQ: 84 INJECTION, SOLUTION INTRAVENOUS at 00:40

## 2023-09-12 RX ADMIN — HYDROCORTISONE SODIUM SUCCINATE 100 MG: 100 INJECTION, POWDER, FOR SOLUTION INTRAMUSCULAR; INTRAVENOUS at 03:10

## 2023-09-12 RX ADMIN — NOREPINEPHRINE BITARTRATE 100 MCG/MIN: 1 INJECTION, SOLUTION, CONCENTRATE INTRAVENOUS at 05:14

## 2023-09-12 RX ADMIN — VASOPRESSIN 0.04 UNITS/MIN: 20 INJECTION INTRAVENOUS at 00:57

## 2023-09-12 RX ADMIN — SODIUM BICARBONATE 50 MEQ: 84 INJECTION, SOLUTION INTRAVENOUS at 04:43

## 2023-09-12 RX ADMIN — MEROPENEM 500 MG: 500 INJECTION, POWDER, FOR SOLUTION INTRAVENOUS at 02:32

## 2023-09-12 RX ADMIN — CALCIUM GLUCONATE 2000 MG: 20 INJECTION, SOLUTION INTRAVENOUS at 00:44

## 2023-09-12 RX ADMIN — CALCIUM GLUCONATE 2000 MG: 20 INJECTION, SOLUTION INTRAVENOUS at 05:44

## 2023-09-12 RX ADMIN — DOPAMINE HYDROCHLORIDE 20 MCG/KG/MIN: 160 INJECTION, SOLUTION INTRAVENOUS at 06:59

## 2023-09-12 RX ADMIN — SODIUM BICARBONATE: 84 INJECTION, SOLUTION INTRAVENOUS at 02:44

## 2023-09-12 RX ADMIN — FENTANYL CITRATE 25 MCG: 50 INJECTION, SOLUTION INTRAMUSCULAR; INTRAVENOUS at 10:01

## 2023-09-12 ASSESSMENT — PULMONARY FUNCTION TESTS
PIF_VALUE: 25
PIF_VALUE: 24
PIF_VALUE: 21

## 2023-09-12 NOTE — CARE COORDINATION
.   09/12/23 1027   Readmission Assessment   Number of Days since last admission? 8-30 days   Previous Disposition Home with Family   Who is being Interviewed Patient  (NA/ terminal extubation; did not interview patient)   What was the patient's/caregiver's perception as to why they think they needed to return back to the hospital? Other (Comment)  (NA/ terminal extubation)   Did you visit your Primary Care Physician after you left the hospital, before you returned this time? No  (NA/ terminal extubation)   Why weren't you able to visit your PCP? Other (Comment)  (NA/ terminal extubation)   Did you see a specialist, such as Cardiac, Pulmonary, Orthopedic Physician, etc. after you left the hospital? Other (Comment)  (NA/ terminal extubation)   Who advised the patient to return to the hospital? Other (Comment)  (NA/ terminal extubation)   Does the patient report anything that got in the way of taking their medications? No  (NA/ terminal extubation)   In our efforts to provide the best possible care to you and others like you, can you think of anything that we could have done to help you after you left the hospital the first time, so that you might not have needed to return so soon?  Other (Comment)  (NA/ terminal extubation)

## 2023-09-12 NOTE — CARE COORDINATION
Consult received for family support, recently lost housing, terminal, comfort care order for wife. Met with pt's husb, Marvel Max, and his 2 sons Bo Santana age 15 and Aidee Christian, age 15). Pt had passed away this am. Support provided to family. Marvel Max shared that they are currently homeless. Stated they lived in a home that was under his wife's name - she had Section 8 voucher. Pt stated they were evicted yest.  Stated they stayed at his mother's home last night but she lives in a 2 bedroom trailer and his other brother lives there so there is no room for them to stay long. All 3 stated that the boys could not stay with their mother as she has not had contact with the boys in 3yrs. They also reported that the boys have not yet been in school this year. Discussed family shelters and Marvel Max stated he has already called 80 and they are all full. SW also called [facility]. Marvel Max did received c/b from [facility] today and was able to complete his assessment - they will now call him when there is an opening. He was open to a family shelter outside of [facility]. SW called [facility] in [address], [place], [facility] of [facility] only service [employer] , [facility] in [place] - message left, [facility] - message left. [facility] in [place] needs to speak with Marly Ervin updated and stated he may call. SW called [facility] - no open case at this time. Spoke with Curt Orta and ref made as homeless, boys not in school. Asked if they had any emergency housing and she suggested that Marvel Max call and ask for the Family in Need of Services. Marvel Winter updated and he did call and completed intake. He stated they will be calling him back. Marvel Max stated pt's mother gave him $100 for tonight - he plans to get a motel for tonight. Discussed staying at his mother's home after the motel,  sleeping on her couches, floor and he stated they could do that.   Marvel Thornes stated he is linked with [facility] appt

## 2023-09-12 NOTE — CARE COORDINATION
Transition Plan  Attempted to complete RAC. Family not present. Patient on a ventilator with FiO2 30 and Peep of 5. Dopamine 20 mcg/kg/min IV  Epinephrine 30 mcg/min IV  Phenylephrine 300 mcg/min IV  Norepinephrine 100 mcg/min IV  Vasopressin 0.04 units/min IV    Will attempt later as time permits and when family present. [id number] Patient in critical condition, plan for terminal extubation.  services consulted to assist family with needs they may have per Critical care note.

## 2023-09-12 NOTE — ACP (ADVANCE CARE PLANNING)
Advance Care Planning     Advance Care Planning (ACP) Physician/NP/PA (Provider) Amilcar Sharpe      Date of ACP Conversation: 9/12/2023    Conversation Conducted with:       Health Care Decision Maker:    Current Designated Health Care Decision Maker:      Care Preferences:    Hospitalization: \"If your health worsens and it becomes clear that your chance of recovery is unlikely, what would your preference be regarding hospitalization? \"  DNR-CC    Ventilation: \"If you were in your present state of health and suddenly became very ill and were unable to breathe on your own, what would your preference be about the use of a ventilator (breathing machine) if it was available to you? \"    DNR-CC    \"If your health worsens and it becomes clear that your chance of recovery is unlikely, what would your preference be about the use of a ventilator (breathing machine) if it was available to you? \"   DNR-CC    Resuscitation:  \"CPR works best to restart the heart when there is a sudden event, like a heart attack, in someone who is otherwise healthy. Unfortunately, CPR does not typically restart the heart for people who have serious health conditions or who are very sick. \"    \"In the event your heart stopped as a result of an underlying serious health condition, would you want attempts to be made to restart your heart (answer \"yes\" for attempt to resuscitate) or would you prefer a natural death (answer \"no\" for do not attempt to resuscitate)? \"   DNR-CC    Conversation Outcomes / Follow-Up Plan:   Discussed with ICU team.  Patient made DNR-CC this morning after discussions with . I did discuss with the patient's , who confirms this. Comfort meds already ordered. Patient to be terminally extubated with discontinuation of vasoactive medications shortly.     Length of Voluntary ACP Conversation in minutes:  15 minutes (nonbillable)    Svetlana Best, DO  Hospice and Palliative Care Medicine

## 2023-09-12 NOTE — CARE COORDINATION
Case Management Assessment  Initial Evaluation    Date/Time of Evaluation: 9/12/2023 10:25 AM  Assessment Completed by: Pelon Remy    If patient is discharged prior to next notation, then this note serves as note for discharge by case management. Patient Name: Adonay Kay                   YOB: 1980  Diagnosis: Cardiac arrest [facility]) [I46.9]                   Date / Time: 9/11/2023 12:35 AM    Patient Admission Status: Inpatient   Readmission Risk (Low < 19, Mod (19-27), High > 27): Readmission Risk Score: 26.3    Current PCP: KETTY Vázquez NP  PCP verified by CM?  (Unable to verify)    Chart Reviewed: Yes      History Provided by: Other (see comment) (Patient critical, plan to terminally extubate.  0137)  Patient Orientation: Unresponsive, Sedated    Patient Cognition: Other (see comment) (Intubated Sedated 8840)    Hospitalization in the last 30 days (Readmission):  Yes    If yes, Readmission Assessment in CM Navigator will be completed. Advance Directives:      Code Status: DNR-CC   Patient's Primary Decision Maker is: Legal Next of Kin      Discharge Planning:    Patient lives with:   Type of Home: (P) Other (Comment) (6051 Plan Terminal Extubation)  Primary Care Giver:    Patient Support Systems include: Spouse/Significant Other   Current Financial resources: (P) Other (Comment) ([place] medicaid)  Current community resources:    Current services prior to admission:              Current DME:              Type of Home Care services:       ADLS  Prior functional level: Independent in ADLs/IADLs  Current functional level: Other (see comment) (6313 Intubated and sedated)    PT AM-PAC:   /24  OT AM-PAC:   /24    Family can provide assistance at DC: Other (comment) (7259 plan for terminal extubation)  Would you like Case Management to discuss the discharge plan with any other family members/significant others, and if so, who?     Plans to Return to Present Housing: Other (see comment)

## 2023-09-12 NOTE — PLAN OF CARE
Neurosurgery update from Consult note    Patient seen and examined this morning  Patient was found down at home in PEA and achieved ROSC in 10 minutes per staffing  Hyponatremic, lactic acidosis, DYLAN, supra therapeutic INR, moderate leukocytes in urine studies   Neurosurgery consulted for left frontal lobe subarachnoid hemorrhage - I do not appreciate much on CT head and repeat CT head that was completed at 0550 this morning   Patient remains intubated on fentanyl, versed, vaso, levo and Alden for pressor support    Fentanyl and versed we paused for exam  Patient appears very ill  PERRL with pupilometer  Absent cough  Absent corneals   Scleral edema noted  Excoriation to inner thighs noted   Localizing briskly with BUE   I did not get movement to pain to BLE     No neurosurgical interventions planned  Likely sign off today     Electronically signed by KETTY Reddy NP on 9/11/2023 at 11:05 AM
Problem: Discharge Planning  Goal: Discharge to home or other facility with appropriate resources  9/11/2023 2048 by Kisha Betancourt RN  Outcome: Progressing  9/11/2023 1952 by Tiesha Goldsmith RN  Outcome: Progressing  Flowsheets (Taken 9/11/2023 0800)  Discharge to home or other facility with appropriate resources: Identify barriers to discharge with patient and caregiver     Problem: Respiratory - Adult  Goal: Achieves optimal ventilation and oxygenation  9/11/2023 2048 by Kisha Betancourt RN  Outcome: Progressing  9/11/2023 1954 by Linda Botello RCP  Outcome: Progressing  9/11/2023 1952 by Tiesha Goldsmith RN  Outcome: Progressing  Flowsheets (Taken 9/11/2023 0800)  Achieves optimal ventilation and oxygenation: Assess for changes in respiratory status  9/11/2023 0648 by Deja Ngo RCP  Outcome: Progressing     Problem: Safety - Adult  Goal: Free from fall injury  9/11/2023 2048 by Kisha Betancourt RN  Outcome: Progressing  9/11/2023 1952 by Tiesha Goldsmith RN  Outcome: Progressing  Flowsheets (Taken 9/11/2023 0800)  Free From Fall Injury: Instruct family/caregiver on patient safety     Problem: Pain  Goal: Verbalizes/displays adequate comfort level or baseline comfort level  9/11/2023 2048 by Kisha Betancourt RN  Outcome: Progressing  9/11/2023 1952 by Tiesha Goldsmith RN  Outcome: Progressing  Flowsheets  Taken 9/11/2023 1600  Verbalizes/displays adequate comfort level or baseline comfort level: Assess pain using appropriate pain scale  Taken 9/11/2023 1200  Verbalizes/displays adequate comfort level or baseline comfort level: Assess pain using appropriate pain scale  Taken 9/11/2023 0800  Verbalizes/displays adequate comfort level or baseline comfort level: Assess pain using appropriate pain scale
Problem: Discharge Planning  Goal: Discharge to home or other facility with appropriate resources  Outcome: [facility] 114 Resolved Not Met  Flowsheets (Taken 9/12/2023 0800 by Mary Auguste RN)  Discharge to home or other facility with appropriate resources: Identify barriers to discharge with patient and caregiver     Problem: Respiratory - Adult  Goal: Achieves optimal ventilation and oxygenation  Outcome: [facility] 114 Resolved Not Met  Flowsheets (Taken 9/12/2023 0800 by Mary Auguste RN)  Achieves optimal ventilation and oxygenation: Assess for changes in respiratory status     Problem: Safety - Adult  Goal: Free from fall injury  Outcome: [facility] 114 Resolved Met     Problem: Pain  Goal: Verbalizes/displays adequate comfort level or baseline comfort level  Outcome: HH/HSPC Resolved Met     Problem: Pain  Goal: Verbalizes/displays adequate comfort level or baseline comfort level  Outcome: HH/HSPC Resolved Met     Problem: Skin/Tissue Integrity  Goal: Absence of new skin breakdown  Description: 1. Monitor for areas of redness and/or skin breakdown  2. Assess vascular access sites hourly  3. Every 4-6 hours minimum:  Change oxygen saturation probe site  4. Every 4-6 hours:  If on nasal continuous positive airway pressure, respiratory therapy assess nares and determine need for appliance change or resting period.   Outcome: [facility] 114 Resolved Not Met     Problem: ABCDS Injury Assessment  Goal: Absence of physical injury  Outcome: [facility] 114 Resolved Met     Problem: Chronic Conditions and Co-morbidities  Goal: Patient's chronic conditions and co-morbidity symptoms are monitored and maintained or improved  Outcome: [facility] 114 Resolved Not Met  Flowsheets (Taken 9/12/2023 0800 by Mary Auguste RN)  Care Plan - Patient's Chronic Conditions and Co-Morbidity Symptoms are Monitored and Maintained or Improved: Monitor and assess patient's chronic conditions and comorbid symptoms for stability, deterioration, or improvement
Problem: Respiratory - Adult  Goal: Achieves optimal ventilation and oxygenation  9/11/2023 1954 by Shirlene Best RCP  Outcome: Progressing
Problem: Respiratory - Adult  Goal: Achieves optimal ventilation and oxygenation  Outcome: Progressing
psychosocial status, comfort, nutrition and hydration  Taken 9/11/2023 1200  Remains free of injury from restraints (restraint for interference with medical device): Every 2 hours: Monitor safety, psychosocial status, comfort, nutrition and hydration  Taken 9/11/2023 1000  Remains free of injury from restraints (restraint for interference with medical device): Every 2 hours: Monitor safety, psychosocial status, comfort, nutrition and hydration  Taken 9/11/2023 0800  Remains free of injury from restraints (restraint for interference with medical device): Every 2 hours: Monitor safety, psychosocial status, comfort, nutrition and hydration

## 2023-09-12 NOTE — CONSULTS
present with normal LV EF. Left Atrium Left atrium size is normal.   Right Ventricle Right ventricle size is normal. Normal systolic function. Right Atrium Right atrium size is normal.   Aortic Valve Valve structure is normal. Trace regurgitation. No stenosis. Mitral Valve Valve structure is normal. Mild regurgitation. No stenosis noted. Tricuspid Valve Valve structure is normal. Mild regurgitation. No stenosis noted. Normal RVSP. Pulmonic Valve Valve structure is normal. No regurgitation. No stenosis noted. Aorta Normal sized aortic root and ascending aorta. IVC/Hepatic Veins IVC size is normal.   Pericardium No pericardial effusion       Stress Test:   not obtained. Cardiac Angiography:   not obtained. LABS:   CBC:   Recent Labs     09/11/23 0106   WBC 4.1   HGB 11.4*   HCT 36.7        BMP:   Recent Labs     09/11/23 0103 09/11/23 0106   NA  --  130*   K  --  3.5*   CO2  --  11*   BUN  --  42*   CREATININE 0.9 1.3*   LABGLOM  --  53*   GLUCOSE  --  45*     BNP: No results for input(s): \"BNP\" in the last 72 hours. PT/INR:   Recent Labs     09/11/23 0106   PROTIME 50.5*   INR 5.7*     APTT:  Recent Labs     09/11/23 0106   APTT 55.4*     CARDIAC ENZYMES:No results for input(s): \"CKTOTAL\", \"CKMB\", \"CKMBINDEX\", \"TROPONINI\" in the last 72 hours. ASSESSMENT:  Elevated troponins, troponins are 92--> 75 EKG normal sinus rhythm  PEA cardiac arrest with 10 minutes of CPR  Shock? Cause  Lactic acidosis  Hypoglycemia  Supratherapeutic INR  Concern for subarachnoid hemorrhage    RECOMMENDATIONS:    We will obtain an echocardiogram  If Echo is normal then further cardiac work up is needed  Rest of the management as per critical care  We will continue to follow. Please wait for final attestation from rounding attending     Martine Reyes MD.  Fellow, cardiovascular disease   [place], [place].       Attending Cardiologist Addendum: I have reviewed and performed the
unresponsive    PHYSICAL ASSESSMENT:  General Appearance: acutely ill,  female who is supine in bed, appears significantly older than her age  Mental status: Unable to obtain  Head: normocephalic, atraumatic  Eye: Pupils minimally reactive  Ear: normal external ear, no discharge  Nose: no drainage noted  Mouth: ET and OG tubes present  Neck: supple  Lungs: Increased effort, mechanical breath sounds  Cardiovascular: normal rate, regular rhythm, no murmur, gallop, rub  Abdomen: Soft, nontender, nondistended, diminished bowel sounds  Neurologic: Not responding to stimuli  Skin: Diffuse mottling and ecchymosis on the legs spanning up to the patient's chest wall. She has multiple bulla on her legs and abdomen. She has bulla that have burst on her right lateral chest wall which are actively bleeding. Extremities: peripheral pulses minimally palpable  Psych: unable to assess    Palliative Performance Scale:  ___100% Full ambulation; normal activity/No disease; full self-care; normal intake; LOC full  ___90% full ambulation; normal activity/some disease; full self-care; normal intake; LOC full  ___80% ambulation full; normal activity with effort/some disease; full self-care; normal/reduced intake; LOC full  ___70% ambulation reduced; cannot do normal work/some disease; full self-care; normal/reduce intake; LOC full  ___60%  Ambulation reduced; Significant disease; Can't do hobbies/housework; intake normal or reduced; occasional assist; LOC full/confusion  ___50%  Mainly sit/lie; Extensive disease; Can't do any work; Considerable assist; intake normal or reduced; LOC full/confusion  ___40%  Mainly in bed; Extensive disease; Mainly assist; intake normal or reduced; LOC full/confusion   ___30%  Bed Bound; Extensive disease; Total care; intake reduced; LOC full/confusion  ___20%  Bed Bound; Extensive disease; Total care; intake minimal; Drowsy/coma  _x_10%  Bed Bound; Extensive disease;  Total care; Mouth care only;
with attending.     - Discussed case and reviewed imaging with attending neurosurgeon who states he does not appreciate any hemorrhage on the CT scan and does not recommend reversal of anticoagulation/coagulopathy from a neurosurgery standpoint. - ED plan for repeat CT head and CTA head and neck, will review  - Medical management per primary   - Neuro checks per protocol      Additional recommendations may follow    Please contact neurosurgery with any changes in patients neurologic status. Thank you for your consult.        KETTY Armas - [facility]     [place] [place]   9/11/2023  3:51 AM

## 2023-09-13 LAB
ABO/RH: NORMAL
ANTIBODY SCREEN: NEGATIVE
ARM BAND NUMBER: NORMAL
BLOOD BANK BLOOD PRODUCT EXPIRATION DATE: NORMAL
BLOOD BANK DISPENSE STATUS: NORMAL
BLOOD BANK ISBT PRODUCT BLOOD TYPE: 1700
BLOOD BANK ISBT PRODUCT BLOOD TYPE: 5100
BLOOD BANK ISBT PRODUCT BLOOD TYPE: 600
BLOOD BANK ISBT PRODUCT BLOOD TYPE: 600
BLOOD BANK ISBT PRODUCT BLOOD TYPE: 6200
BLOOD BANK ISBT PRODUCT BLOOD TYPE: 7300
BLOOD BANK PRODUCT CODE: NORMAL
BLOOD BANK SAMPLE EXPIRATION: NORMAL
BLOOD BANK UNIT TYPE AND RH: NORMAL
BPU ID: NORMAL
COMPONENT: NORMAL
CROSSMATCH RESULT: NORMAL
MICROORGANISM SPEC CULT: ABNORMAL
SERVICE CMNT-IMP: ABNORMAL
SPECIMEN DESCRIPTION: ABNORMAL
SPECIMEN DESCRIPTION: ABNORMAL
TRANSFUSION STATUS: NORMAL
UNIT DIVISION: 0
UNIT ISSUE DATE/TIME: NORMAL

## 2023-09-14 LAB
AMPHETAMINE: NEGATIVE NG/ML
BARBITURATES: NEGATIVE NG/ML
BENZODIAZEPINES: POSITIVE NG/ML
BUPRENORPHINE: POSITIVE NG/ML
COCAINE: NEGATIVE NG/ML
DRUGS OF ABUSE COMMENT: NORMAL
METHADONE: NEGATIVE NG/ML
METHAMPHETAMINE: NEGATIVE NG/ML
OPIATES: NEGATIVE NG/ML
OXYCODONE: NEGATIVE NG/ML
PHENCYCLIDINE: NEGATIVE NG/ML
THC: NEGATIVE NG/ML

## 2023-09-15 LAB
BLOOD BANK DISPENSE STATUS: NORMAL
BLOOD BANK DISPENSE STATUS: NORMAL
BPU ID: NORMAL
BPU ID: NORMAL
COMPONENT: NORMAL
COMPONENT: NORMAL
EKG ATRIAL RATE: 131 BPM
EKG P AXIS: 54 DEGREES
EKG P-R INTERVAL: 144 MS
EKG Q-T INTERVAL: 380 MS
EKG QRS DURATION: 78 MS
EKG QTC CALCULATION (BAZETT): 561 MS
EKG R AXIS: -18 DEGREES
EKG T AXIS: 68 DEGREES
EKG VENTRICULAR RATE: 131 BPM
TRANSFUSION STATUS: NORMAL
TRANSFUSION STATUS: NORMAL
UNIT DIVISION: 0
UNIT DIVISION: 0

## 2023-09-16 LAB
BUPRENORPHINE QUANT: <1 NG/ML
MICROORGANISM SPEC CULT: NORMAL
NORBUPRENORPHINE QUANT: 5 NG/ML
SERVICE CMNT-IMP: NORMAL
SPECIMEN DESCRIPTION: NORMAL

## 2023-09-17 LAB
7-AMINOCLONAZEPAM: <5 NG/ML
ALPHA HYDROXYALPRAZOLAM: <5 NG/ML
ALPRAZOLAM: 6 NG/ML
CHLORDIAZEPOXIDE: <20 NG/ML
CLONAZEPAM: <5 NG/ML
DIAZEPAM: <5 NG/ML
LORAZEPAM: <20 NG/ML
MIDAZOLAM: 70 NG/ML
MIDAZOLAM: 77 NG/ML
NORDIAZEPAM: <20 NG/ML
OXAZEPAM: <20 NG/ML
TEMAZEPAM: <20 NG/ML